# Patient Record
Sex: MALE | Race: OTHER | URBAN - METROPOLITAN AREA
[De-identification: names, ages, dates, MRNs, and addresses within clinical notes are randomized per-mention and may not be internally consistent; named-entity substitution may affect disease eponyms.]

---

## 2023-07-02 ENCOUNTER — EMERGENCY (EMERGENCY)
Facility: HOSPITAL | Age: 43
LOS: 1 days | Discharge: HOME | End: 2023-07-02
Attending: EMERGENCY MEDICINE
Payer: SELF-PAY

## 2023-07-02 VITALS
HEART RATE: 80 BPM | TEMPERATURE: 97 F | SYSTOLIC BLOOD PRESSURE: 116 MMHG | WEIGHT: 169.98 LBS | OXYGEN SATURATION: 95 % | DIASTOLIC BLOOD PRESSURE: 79 MMHG | RESPIRATION RATE: 15 BRPM | HEIGHT: 69 IN

## 2023-07-02 VITALS
OXYGEN SATURATION: 99 % | RESPIRATION RATE: 18 BRPM | DIASTOLIC BLOOD PRESSURE: 88 MMHG | SYSTOLIC BLOOD PRESSURE: 123 MMHG | TEMPERATURE: 98 F | HEART RATE: 90 BPM

## 2023-07-02 PROCEDURE — 99283 EMERGENCY DEPT VISIT LOW MDM: CPT

## 2023-07-02 PROCEDURE — 99285 EMERGENCY DEPT VISIT HI MDM: CPT

## 2023-07-02 PROCEDURE — 82962 GLUCOSE BLOOD TEST: CPT

## 2023-07-02 PROCEDURE — 99053 MED SERV 10PM-8AM 24 HR FAC: CPT

## 2023-07-02 NOTE — ED PROVIDER NOTE - PATIENT PORTAL LINK FT
You can access the FollowMyHealth Patient Portal offered by [facility] by registering at the following website: [email or web address]. By joining [facility]’s FollowMyHealth portal, you will also be able to view your health information using other applications (apps) compatible with our system.

## 2023-07-02 NOTE — ED ADULT TRIAGE NOTE - CHIEF COMPLAINT QUOTE
[place]ration to rt. knee and c/o head ache, told ems that he drink a lot

## 2023-07-02 NOTE — ED ADULT NURSE NOTE - OBJECTIVE STATEMENT
42 year old male denies [facility] BIBA for alcohol intoxication. pt denies all complaints at this time.

## 2023-07-02 NOTE — ED PROVIDER NOTE - OBJECTIVE STATEMENT
42 year old male denies [facility] BIBA for alcohol intoxication. pt denies all complaints at this time. [Takes medication as prescribed] : takes

## 2023-12-28 ENCOUNTER — INPATIENT (INPATIENT)
Facility: HOSPITAL | Age: 43
LOS: 2 days | Discharge: HOME | DRG: 369 | End: 2023-12-31
Attending: INTERNAL MEDICINE | Admitting: INTERNAL MEDICINE
Payer: MEDICAID

## 2023-12-28 VITALS
RESPIRATION RATE: 16 BRPM | TEMPERATURE: 98 F | SYSTOLIC BLOOD PRESSURE: 135 MMHG | OXYGEN SATURATION: 98 % | HEART RATE: 132 BPM | DIASTOLIC BLOOD PRESSURE: 97 MMHG

## 2023-12-28 DIAGNOSIS — K92.2 GASTROINTESTINAL HEMORRHAGE, UNSPECIFIED: ICD-10-CM

## 2023-12-28 DIAGNOSIS — D69.6 THROMBOCYTOPENIA, UNSPECIFIED: ICD-10-CM

## 2023-12-28 DIAGNOSIS — Z29.9 ENCOUNTER FOR PROPHYLACTIC MEASURES, UNSPECIFIED: ICD-10-CM

## 2023-12-28 DIAGNOSIS — F10.239 ALCOHOL DEPENDENCE WITH WITHDRAWAL, UNSPECIFIED: ICD-10-CM

## 2023-12-28 LAB
ALBUMIN SERPL ELPH-MCNC: 3.4 G/DL — SIGNIFICANT CHANGE UP (ref 3.4–5)
ALBUMIN SERPL ELPH-MCNC: 3.4 G/DL — SIGNIFICANT CHANGE UP (ref 3.4–5)
ALBUMIN SERPL ELPH-MCNC: 3.9 G/DL — SIGNIFICANT CHANGE UP (ref 3.3–5)
ALBUMIN SERPL ELPH-MCNC: 3.9 G/DL — SIGNIFICANT CHANGE UP (ref 3.3–5)
ALBUMIN SERPL ELPH-MCNC: 4.4 G/DL — SIGNIFICANT CHANGE UP (ref 3.4–5)
ALBUMIN SERPL ELPH-MCNC: 4.4 G/DL — SIGNIFICANT CHANGE UP (ref 3.4–5)
ALP SERPL-CCNC: 163 U/L — HIGH (ref 40–120)
ALP SERPL-CCNC: 163 U/L — HIGH (ref 40–120)
ALP SERPL-CCNC: 174 U/L — HIGH (ref 40–120)
ALP SERPL-CCNC: 174 U/L — HIGH (ref 40–120)
ALP SERPL-CCNC: 231 U/L — HIGH (ref 40–120)
ALP SERPL-CCNC: 231 U/L — HIGH (ref 40–120)
ALT FLD-CCNC: 43 U/L — SIGNIFICANT CHANGE UP (ref 10–45)
ALT FLD-CCNC: 43 U/L — SIGNIFICANT CHANGE UP (ref 10–45)
ALT FLD-CCNC: 54 U/L — HIGH (ref 12–42)
ALT FLD-CCNC: 54 U/L — HIGH (ref 12–42)
ALT FLD-CCNC: 69 U/L — HIGH (ref 12–42)
ALT FLD-CCNC: 69 U/L — HIGH (ref 12–42)
AMPHET UR-MCNC: NEGATIVE — SIGNIFICANT CHANGE UP
AMPHET UR-MCNC: NEGATIVE — SIGNIFICANT CHANGE UP
ANION GAP SERPL CALC-SCNC: 14 MMOL/L — SIGNIFICANT CHANGE UP (ref 5–17)
ANION GAP SERPL CALC-SCNC: 14 MMOL/L — SIGNIFICANT CHANGE UP (ref 5–17)
ANION GAP SERPL CALC-SCNC: 16 MMOL/L — SIGNIFICANT CHANGE UP (ref 9–16)
ANION GAP SERPL CALC-SCNC: 16 MMOL/L — SIGNIFICANT CHANGE UP (ref 9–16)
ANION GAP SERPL CALC-SCNC: 19 MMOL/L — HIGH (ref 9–16)
ANION GAP SERPL CALC-SCNC: 19 MMOL/L — HIGH (ref 9–16)
APPEARANCE UR: CLEAR — SIGNIFICANT CHANGE UP
APPEARANCE UR: CLEAR — SIGNIFICANT CHANGE UP
APTT BLD: 28.9 SEC — SIGNIFICANT CHANGE UP (ref 24.5–35.6)
APTT BLD: 28.9 SEC — SIGNIFICANT CHANGE UP (ref 24.5–35.6)
APTT BLD: 30.4 SEC — SIGNIFICANT CHANGE UP (ref 24.5–35.6)
APTT BLD: 30.4 SEC — SIGNIFICANT CHANGE UP (ref 24.5–35.6)
AST SERPL-CCNC: 105 U/L — HIGH (ref 15–37)
AST SERPL-CCNC: 105 U/L — HIGH (ref 15–37)
AST SERPL-CCNC: 142 U/L — HIGH (ref 15–37)
AST SERPL-CCNC: 142 U/L — HIGH (ref 15–37)
AST SERPL-CCNC: 98 U/L — HIGH (ref 10–40)
AST SERPL-CCNC: 98 U/L — HIGH (ref 10–40)
BACTERIA # UR AUTO: ABNORMAL /HPF
BACTERIA # UR AUTO: ABNORMAL /HPF
BARBITURATES UR SCN-MCNC: NEGATIVE — SIGNIFICANT CHANGE UP
BARBITURATES UR SCN-MCNC: NEGATIVE — SIGNIFICANT CHANGE UP
BASOPHILS # BLD AUTO: 0.03 K/UL — SIGNIFICANT CHANGE UP (ref 0–0.2)
BASOPHILS # BLD AUTO: 0.03 K/UL — SIGNIFICANT CHANGE UP (ref 0–0.2)
BASOPHILS # BLD AUTO: 0.05 K/UL — SIGNIFICANT CHANGE UP (ref 0–0.2)
BASOPHILS # BLD AUTO: 0.05 K/UL — SIGNIFICANT CHANGE UP (ref 0–0.2)
BASOPHILS NFR BLD AUTO: 0.6 % — SIGNIFICANT CHANGE UP (ref 0–2)
BASOPHILS NFR BLD AUTO: 0.6 % — SIGNIFICANT CHANGE UP (ref 0–2)
BASOPHILS NFR BLD AUTO: 0.7 % — SIGNIFICANT CHANGE UP (ref 0–2)
BASOPHILS NFR BLD AUTO: 0.7 % — SIGNIFICANT CHANGE UP (ref 0–2)
BENZODIAZ UR-MCNC: NEGATIVE — SIGNIFICANT CHANGE UP
BENZODIAZ UR-MCNC: NEGATIVE — SIGNIFICANT CHANGE UP
BILIRUB DIRECT SERPL-MCNC: 0.6 MG/DL — HIGH (ref 0–0.3)
BILIRUB DIRECT SERPL-MCNC: 0.6 MG/DL — HIGH (ref 0–0.3)
BILIRUB INDIRECT FLD-MCNC: 0.5 MG/DL — SIGNIFICANT CHANGE UP (ref 0.2–1)
BILIRUB INDIRECT FLD-MCNC: 0.5 MG/DL — SIGNIFICANT CHANGE UP (ref 0.2–1)
BILIRUB SERPL-MCNC: 1.1 MG/DL — SIGNIFICANT CHANGE UP (ref 0.2–1.2)
BILIRUB SERPL-MCNC: 1.1 MG/DL — SIGNIFICANT CHANGE UP (ref 0.2–1.2)
BILIRUB SERPL-MCNC: 1.3 MG/DL — HIGH (ref 0.2–1.2)
BILIRUB SERPL-MCNC: 1.3 MG/DL — HIGH (ref 0.2–1.2)
BILIRUB SERPL-MCNC: 1.7 MG/DL — HIGH (ref 0.2–1.2)
BILIRUB SERPL-MCNC: 1.7 MG/DL — HIGH (ref 0.2–1.2)
BILIRUB UR-MCNC: ABNORMAL
BILIRUB UR-MCNC: ABNORMAL
BLD GP AB SCN SERPL QL: NEGATIVE — SIGNIFICANT CHANGE UP
BLD GP AB SCN SERPL QL: NEGATIVE — SIGNIFICANT CHANGE UP
BUN SERPL-MCNC: 10 MG/DL — SIGNIFICANT CHANGE UP (ref 7–23)
BUN SERPL-MCNC: 10 MG/DL — SIGNIFICANT CHANGE UP (ref 7–23)
BUN SERPL-MCNC: 12 MG/DL — SIGNIFICANT CHANGE UP (ref 7–23)
BUN SERPL-MCNC: 12 MG/DL — SIGNIFICANT CHANGE UP (ref 7–23)
BUN SERPL-MCNC: 20 MG/DL — SIGNIFICANT CHANGE UP (ref 7–23)
BUN SERPL-MCNC: 20 MG/DL — SIGNIFICANT CHANGE UP (ref 7–23)
CALCIUM SERPL-MCNC: 7.7 MG/DL — LOW (ref 8.5–10.5)
CALCIUM SERPL-MCNC: 7.7 MG/DL — LOW (ref 8.5–10.5)
CALCIUM SERPL-MCNC: 7.9 MG/DL — LOW (ref 8.4–10.5)
CALCIUM SERPL-MCNC: 7.9 MG/DL — LOW (ref 8.4–10.5)
CALCIUM SERPL-MCNC: 9.3 MG/DL — SIGNIFICANT CHANGE UP (ref 8.5–10.5)
CALCIUM SERPL-MCNC: 9.3 MG/DL — SIGNIFICANT CHANGE UP (ref 8.5–10.5)
CHLORIDE SERPL-SCNC: 91 MMOL/L — LOW (ref 96–108)
CHLORIDE SERPL-SCNC: 91 MMOL/L — LOW (ref 96–108)
CHLORIDE SERPL-SCNC: 97 MMOL/L — SIGNIFICANT CHANGE UP (ref 96–108)
CO2 SERPL-SCNC: 23 MMOL/L — SIGNIFICANT CHANGE UP (ref 22–31)
CO2 SERPL-SCNC: 24 MMOL/L — SIGNIFICANT CHANGE UP (ref 22–31)
CO2 SERPL-SCNC: 24 MMOL/L — SIGNIFICANT CHANGE UP (ref 22–31)
COCAINE METAB.OTHER UR-MCNC: NEGATIVE — SIGNIFICANT CHANGE UP
COCAINE METAB.OTHER UR-MCNC: NEGATIVE — SIGNIFICANT CHANGE UP
COLOR SPEC: SIGNIFICANT CHANGE UP
COLOR SPEC: SIGNIFICANT CHANGE UP
CREAT SERPL-MCNC: 0.43 MG/DL — LOW (ref 0.5–1.3)
CREAT SERPL-MCNC: 0.43 MG/DL — LOW (ref 0.5–1.3)
CREAT SERPL-MCNC: 0.58 MG/DL — SIGNIFICANT CHANGE UP (ref 0.5–1.3)
CREAT SERPL-MCNC: 0.58 MG/DL — SIGNIFICANT CHANGE UP (ref 0.5–1.3)
CREAT SERPL-MCNC: 0.77 MG/DL — SIGNIFICANT CHANGE UP (ref 0.5–1.3)
CREAT SERPL-MCNC: 0.77 MG/DL — SIGNIFICANT CHANGE UP (ref 0.5–1.3)
DIFF PNL FLD: ABNORMAL
DIFF PNL FLD: ABNORMAL
EGFR: 113 ML/MIN/1.73M2 — SIGNIFICANT CHANGE UP
EGFR: 113 ML/MIN/1.73M2 — SIGNIFICANT CHANGE UP
EGFR: 123 ML/MIN/1.73M2 — SIGNIFICANT CHANGE UP
EGFR: 123 ML/MIN/1.73M2 — SIGNIFICANT CHANGE UP
EGFR: 135 ML/MIN/1.73M2 — SIGNIFICANT CHANGE UP
EGFR: 135 ML/MIN/1.73M2 — SIGNIFICANT CHANGE UP
EOSINOPHIL # BLD AUTO: 0.01 K/UL — SIGNIFICANT CHANGE UP (ref 0–0.5)
EOSINOPHIL # BLD AUTO: 0.01 K/UL — SIGNIFICANT CHANGE UP (ref 0–0.5)
EOSINOPHIL # BLD AUTO: 0.02 K/UL — SIGNIFICANT CHANGE UP (ref 0–0.5)
EOSINOPHIL # BLD AUTO: 0.02 K/UL — SIGNIFICANT CHANGE UP (ref 0–0.5)
EOSINOPHIL NFR BLD AUTO: 0.1 % — SIGNIFICANT CHANGE UP (ref 0–6)
EOSINOPHIL NFR BLD AUTO: 0.1 % — SIGNIFICANT CHANGE UP (ref 0–6)
EOSINOPHIL NFR BLD AUTO: 0.4 % — SIGNIFICANT CHANGE UP (ref 0–6)
EOSINOPHIL NFR BLD AUTO: 0.4 % — SIGNIFICANT CHANGE UP (ref 0–6)
ETHANOL SERPL-MCNC: 350 MG/DL — HIGH
ETHANOL SERPL-MCNC: 350 MG/DL — HIGH
FENTANYL UR QL SCN: NEGATIVE — SIGNIFICANT CHANGE UP
FENTANYL UR QL SCN: NEGATIVE — SIGNIFICANT CHANGE UP
GIANT PLATELETS BLD QL SMEAR: PRESENT — SIGNIFICANT CHANGE UP
GLUCOSE SERPL-MCNC: 86 MG/DL — SIGNIFICANT CHANGE UP (ref 70–99)
GLUCOSE SERPL-MCNC: 86 MG/DL — SIGNIFICANT CHANGE UP (ref 70–99)
GLUCOSE SERPL-MCNC: 91 MG/DL — SIGNIFICANT CHANGE UP (ref 70–99)
GLUCOSE SERPL-MCNC: 91 MG/DL — SIGNIFICANT CHANGE UP (ref 70–99)
GLUCOSE SERPL-MCNC: 98 MG/DL — SIGNIFICANT CHANGE UP (ref 70–99)
GLUCOSE SERPL-MCNC: 98 MG/DL — SIGNIFICANT CHANGE UP (ref 70–99)
GLUCOSE UR QL: 100 MG/DL
GLUCOSE UR QL: 100 MG/DL
GRAN CASTS # UR COMP ASSIST: PRESENT
GRAN CASTS # UR COMP ASSIST: PRESENT
HCT VFR BLD CALC: 29.8 % — LOW (ref 39–50)
HCT VFR BLD CALC: 29.8 % — LOW (ref 39–50)
HCT VFR BLD CALC: 31.2 % — LOW (ref 39–50)
HCT VFR BLD CALC: 31.2 % — LOW (ref 39–50)
HCT VFR BLD CALC: 40.6 % — SIGNIFICANT CHANGE UP (ref 39–50)
HCT VFR BLD CALC: 40.6 % — SIGNIFICANT CHANGE UP (ref 39–50)
HGB BLD-MCNC: 10.1 G/DL — LOW (ref 13–17)
HGB BLD-MCNC: 10.1 G/DL — LOW (ref 13–17)
HGB BLD-MCNC: 10.4 G/DL — LOW (ref 13–17)
HGB BLD-MCNC: 10.4 G/DL — LOW (ref 13–17)
HGB BLD-MCNC: 13.5 G/DL — SIGNIFICANT CHANGE UP (ref 13–17)
HGB BLD-MCNC: 13.5 G/DL — SIGNIFICANT CHANGE UP (ref 13–17)
HYALINE CASTS # UR AUTO: PRESENT
HYALINE CASTS # UR AUTO: PRESENT
IMM GRANULOCYTES NFR BLD AUTO: 0.1 % — SIGNIFICANT CHANGE UP (ref 0–0.9)
IMM GRANULOCYTES NFR BLD AUTO: 0.1 % — SIGNIFICANT CHANGE UP (ref 0–0.9)
IMM GRANULOCYTES NFR BLD AUTO: 0.2 % — SIGNIFICANT CHANGE UP (ref 0–0.9)
IMM GRANULOCYTES NFR BLD AUTO: 0.2 % — SIGNIFICANT CHANGE UP (ref 0–0.9)
INR BLD: 1.14 — SIGNIFICANT CHANGE UP (ref 0.85–1.18)
KETONES UR-MCNC: >=160 MG/DL
KETONES UR-MCNC: >=160 MG/DL
LACTATE BLDV-MCNC: 2.2 MMOL/L — HIGH (ref 0.5–2)
LACTATE BLDV-MCNC: 2.2 MMOL/L — HIGH (ref 0.5–2)
LACTATE BLDV-MCNC: 5.4 MMOL/L — CRITICAL HIGH (ref 0.5–2)
LACTATE BLDV-MCNC: 5.4 MMOL/L — CRITICAL HIGH (ref 0.5–2)
LEUKOCYTE ESTERASE UR-ACNC: NEGATIVE — SIGNIFICANT CHANGE UP
LEUKOCYTE ESTERASE UR-ACNC: NEGATIVE — SIGNIFICANT CHANGE UP
LIDOCAIN IGE QN: 62 U/L — SIGNIFICANT CHANGE UP (ref 16–77)
LIDOCAIN IGE QN: 62 U/L — SIGNIFICANT CHANGE UP (ref 16–77)
LYMPHOCYTES # BLD AUTO: 2.04 K/UL — SIGNIFICANT CHANGE UP (ref 1–3.3)
LYMPHOCYTES # BLD AUTO: 2.04 K/UL — SIGNIFICANT CHANGE UP (ref 1–3.3)
LYMPHOCYTES # BLD AUTO: 2.08 K/UL — SIGNIFICANT CHANGE UP (ref 1–3.3)
LYMPHOCYTES # BLD AUTO: 2.08 K/UL — SIGNIFICANT CHANGE UP (ref 1–3.3)
LYMPHOCYTES # BLD AUTO: 30.9 % — SIGNIFICANT CHANGE UP (ref 13–44)
LYMPHOCYTES # BLD AUTO: 30.9 % — SIGNIFICANT CHANGE UP (ref 13–44)
LYMPHOCYTES # BLD AUTO: 39.9 % — SIGNIFICANT CHANGE UP (ref 13–44)
LYMPHOCYTES # BLD AUTO: 39.9 % — SIGNIFICANT CHANGE UP (ref 13–44)
MAGNESIUM SERPL-MCNC: 1.2 MG/DL — LOW (ref 1.6–2.6)
MAGNESIUM SERPL-MCNC: 1.2 MG/DL — LOW (ref 1.6–2.6)
MAGNESIUM SERPL-MCNC: 1.5 MG/DL — LOW (ref 1.6–2.6)
MAGNESIUM SERPL-MCNC: 1.5 MG/DL — LOW (ref 1.6–2.6)
MANUAL SMEAR VERIFICATION: SIGNIFICANT CHANGE UP
MCHC RBC-ENTMCNC: 27.1 PG — SIGNIFICANT CHANGE UP (ref 27–34)
MCHC RBC-ENTMCNC: 27.1 PG — SIGNIFICANT CHANGE UP (ref 27–34)
MCHC RBC-ENTMCNC: 27.4 PG — SIGNIFICANT CHANGE UP (ref 27–34)
MCHC RBC-ENTMCNC: 33.3 GM/DL — SIGNIFICANT CHANGE UP (ref 32–36)
MCHC RBC-ENTMCNC: 33.9 GM/DL — SIGNIFICANT CHANGE UP (ref 32–36)
MCHC RBC-ENTMCNC: 33.9 GM/DL — SIGNIFICANT CHANGE UP (ref 32–36)
MCV RBC AUTO: 80.8 FL — SIGNIFICANT CHANGE UP (ref 80–100)
MCV RBC AUTO: 80.8 FL — SIGNIFICANT CHANGE UP (ref 80–100)
MCV RBC AUTO: 81.4 FL — SIGNIFICANT CHANGE UP (ref 80–100)
MCV RBC AUTO: 81.4 FL — SIGNIFICANT CHANGE UP (ref 80–100)
MCV RBC AUTO: 82.3 FL — SIGNIFICANT CHANGE UP (ref 80–100)
MCV RBC AUTO: 82.3 FL — SIGNIFICANT CHANGE UP (ref 80–100)
METHADONE UR-MCNC: NEGATIVE — SIGNIFICANT CHANGE UP
METHADONE UR-MCNC: NEGATIVE — SIGNIFICANT CHANGE UP
MONOCYTES # BLD AUTO: 0.21 K/UL — SIGNIFICANT CHANGE UP (ref 0–0.9)
MONOCYTES # BLD AUTO: 0.21 K/UL — SIGNIFICANT CHANGE UP (ref 0–0.9)
MONOCYTES # BLD AUTO: 0.31 K/UL — SIGNIFICANT CHANGE UP (ref 0–0.9)
MONOCYTES # BLD AUTO: 0.31 K/UL — SIGNIFICANT CHANGE UP (ref 0–0.9)
MONOCYTES NFR BLD AUTO: 4.1 % — SIGNIFICANT CHANGE UP (ref 2–14)
MONOCYTES NFR BLD AUTO: 4.1 % — SIGNIFICANT CHANGE UP (ref 2–14)
MONOCYTES NFR BLD AUTO: 4.6 % — SIGNIFICANT CHANGE UP (ref 2–14)
MONOCYTES NFR BLD AUTO: 4.6 % — SIGNIFICANT CHANGE UP (ref 2–14)
NEUTROPHILS # BLD AUTO: 2.8 K/UL — SIGNIFICANT CHANGE UP (ref 1.8–7.4)
NEUTROPHILS # BLD AUTO: 2.8 K/UL — SIGNIFICANT CHANGE UP (ref 1.8–7.4)
NEUTROPHILS # BLD AUTO: 4.27 K/UL — SIGNIFICANT CHANGE UP (ref 1.8–7.4)
NEUTROPHILS # BLD AUTO: 4.27 K/UL — SIGNIFICANT CHANGE UP (ref 1.8–7.4)
NEUTROPHILS NFR BLD AUTO: 54.8 % — SIGNIFICANT CHANGE UP (ref 43–77)
NEUTROPHILS NFR BLD AUTO: 54.8 % — SIGNIFICANT CHANGE UP (ref 43–77)
NEUTROPHILS NFR BLD AUTO: 63.6 % — SIGNIFICANT CHANGE UP (ref 43–77)
NEUTROPHILS NFR BLD AUTO: 63.6 % — SIGNIFICANT CHANGE UP (ref 43–77)
NITRITE UR-MCNC: NEGATIVE — SIGNIFICANT CHANGE UP
NITRITE UR-MCNC: NEGATIVE — SIGNIFICANT CHANGE UP
NRBC # BLD: 0 /100 WBCS — SIGNIFICANT CHANGE UP (ref 0–0)
OPIATES UR-MCNC: NEGATIVE — SIGNIFICANT CHANGE UP
OPIATES UR-MCNC: NEGATIVE — SIGNIFICANT CHANGE UP
PCO2 BLDV: 36 MMHG — LOW (ref 42–55)
PCO2 BLDV: 36 MMHG — LOW (ref 42–55)
PCP SPEC-MCNC: SIGNIFICANT CHANGE UP
PCP SPEC-MCNC: SIGNIFICANT CHANGE UP
PCP UR-MCNC: NEGATIVE — SIGNIFICANT CHANGE UP
PCP UR-MCNC: NEGATIVE — SIGNIFICANT CHANGE UP
PH BLDV: 7.45 — HIGH (ref 7.32–7.43)
PH BLDV: 7.45 — HIGH (ref 7.32–7.43)
PH UR: 6.5 — SIGNIFICANT CHANGE UP (ref 5–8)
PH UR: 6.5 — SIGNIFICANT CHANGE UP (ref 5–8)
PHOSPHATE SERPL-MCNC: 2.2 MG/DL — LOW (ref 2.5–4.5)
PHOSPHATE SERPL-MCNC: 2.2 MG/DL — LOW (ref 2.5–4.5)
PHOSPHATE SERPL-MCNC: 3.2 MG/DL — SIGNIFICANT CHANGE UP (ref 2.5–4.5)
PHOSPHATE SERPL-MCNC: 3.2 MG/DL — SIGNIFICANT CHANGE UP (ref 2.5–4.5)
PLAT MORPH BLD: ABNORMAL
PLATELET # BLD AUTO: 30 K/UL — LOW (ref 150–400)
PLATELET # BLD AUTO: 30 K/UL — LOW (ref 150–400)
PLATELET # BLD AUTO: 38 K/UL — LOW (ref 150–400)
PLATELET # BLD AUTO: 38 K/UL — LOW (ref 150–400)
PLATELET # BLD AUTO: 61 K/UL — LOW (ref 150–400)
PLATELET # BLD AUTO: 61 K/UL — LOW (ref 150–400)
PLATELET COUNT - ESTIMATE: ABNORMAL
PO2 BLDV: 45 MMHG — SIGNIFICANT CHANGE UP (ref 25–45)
PO2 BLDV: 45 MMHG — SIGNIFICANT CHANGE UP (ref 25–45)
POTASSIUM SERPL-MCNC: 3.2 MMOL/L — LOW (ref 3.5–5.3)
POTASSIUM SERPL-MCNC: 3.2 MMOL/L — LOW (ref 3.5–5.3)
POTASSIUM SERPL-MCNC: 3.5 MMOL/L — SIGNIFICANT CHANGE UP (ref 3.5–5.3)
POTASSIUM SERPL-SCNC: 3.2 MMOL/L — LOW (ref 3.5–5.3)
POTASSIUM SERPL-SCNC: 3.2 MMOL/L — LOW (ref 3.5–5.3)
POTASSIUM SERPL-SCNC: 3.5 MMOL/L — SIGNIFICANT CHANGE UP (ref 3.5–5.3)
PROT SERPL-MCNC: 10.6 G/DL — HIGH (ref 6.4–8.2)
PROT SERPL-MCNC: 10.6 G/DL — HIGH (ref 6.4–8.2)
PROT SERPL-MCNC: 8 G/DL — SIGNIFICANT CHANGE UP (ref 6–8.3)
PROT SERPL-MCNC: 8 G/DL — SIGNIFICANT CHANGE UP (ref 6–8.3)
PROT SERPL-MCNC: 8.7 G/DL — HIGH (ref 6.4–8.2)
PROT SERPL-MCNC: 8.7 G/DL — HIGH (ref 6.4–8.2)
PROT UR-MCNC: 300 MG/DL
PROT UR-MCNC: 300 MG/DL
PROTHROM AB SERPL-ACNC: 12.5 SEC — SIGNIFICANT CHANGE UP (ref 9.5–13)
PROTHROM AB SERPL-ACNC: 12.5 SEC — SIGNIFICANT CHANGE UP (ref 9.5–13)
PROTHROM AB SERPL-ACNC: 12.9 SEC — SIGNIFICANT CHANGE UP (ref 9.5–13)
PROTHROM AB SERPL-ACNC: 12.9 SEC — SIGNIFICANT CHANGE UP (ref 9.5–13)
RBC # BLD: 3.69 M/UL — LOW (ref 4.2–5.8)
RBC # BLD: 3.69 M/UL — LOW (ref 4.2–5.8)
RBC # BLD: 3.79 M/UL — LOW (ref 4.2–5.8)
RBC # BLD: 3.79 M/UL — LOW (ref 4.2–5.8)
RBC # BLD: 4.99 M/UL — SIGNIFICANT CHANGE UP (ref 4.2–5.8)
RBC # BLD: 4.99 M/UL — SIGNIFICANT CHANGE UP (ref 4.2–5.8)
RBC # FLD: 16.5 % — HIGH (ref 10.3–14.5)
RBC # FLD: 16.5 % — HIGH (ref 10.3–14.5)
RBC # FLD: 16.6 % — HIGH (ref 10.3–14.5)
RBC BLD AUTO: NORMAL — SIGNIFICANT CHANGE UP
RBC CASTS # UR COMP ASSIST: >100 /HPF — HIGH (ref 0–4)
RBC CASTS # UR COMP ASSIST: >100 /HPF — HIGH (ref 0–4)
RH IG SCN BLD-IMP: POSITIVE — SIGNIFICANT CHANGE UP
RH IG SCN BLD-IMP: POSITIVE — SIGNIFICANT CHANGE UP
SAO2 % BLDV: 67.8 % — SIGNIFICANT CHANGE UP (ref 67–88)
SAO2 % BLDV: 67.8 % — SIGNIFICANT CHANGE UP (ref 67–88)
SODIUM SERPL-SCNC: 134 MMOL/L — LOW (ref 135–145)
SODIUM SERPL-SCNC: 134 MMOL/L — LOW (ref 135–145)
SODIUM SERPL-SCNC: 134 MMOL/L — SIGNIFICANT CHANGE UP (ref 132–145)
SODIUM SERPL-SCNC: 134 MMOL/L — SIGNIFICANT CHANGE UP (ref 132–145)
SODIUM SERPL-SCNC: 136 MMOL/L — SIGNIFICANT CHANGE UP (ref 132–145)
SODIUM SERPL-SCNC: 136 MMOL/L — SIGNIFICANT CHANGE UP (ref 132–145)
SP GR SPEC: 1.02 — SIGNIFICANT CHANGE UP (ref 1–1.03)
SP GR SPEC: 1.02 — SIGNIFICANT CHANGE UP (ref 1–1.03)
THC UR QL: NEGATIVE — SIGNIFICANT CHANGE UP
THC UR QL: NEGATIVE — SIGNIFICANT CHANGE UP
URATE CRY FLD QL MICRO: PRESENT
URATE CRY FLD QL MICRO: PRESENT
UROBILINOGEN FLD QL: >=8 MG/DL (ref 0.2–1)
UROBILINOGEN FLD QL: >=8 MG/DL (ref 0.2–1)
WBC # BLD: 4.59 K/UL — SIGNIFICANT CHANGE UP (ref 3.8–10.5)
WBC # BLD: 4.59 K/UL — SIGNIFICANT CHANGE UP (ref 3.8–10.5)
WBC # BLD: 5.11 K/UL — SIGNIFICANT CHANGE UP (ref 3.8–10.5)
WBC # BLD: 5.11 K/UL — SIGNIFICANT CHANGE UP (ref 3.8–10.5)
WBC # BLD: 6.73 K/UL — SIGNIFICANT CHANGE UP (ref 3.8–10.5)
WBC # BLD: 6.73 K/UL — SIGNIFICANT CHANGE UP (ref 3.8–10.5)
WBC # FLD AUTO: 4.59 K/UL — SIGNIFICANT CHANGE UP (ref 3.8–10.5)
WBC # FLD AUTO: 4.59 K/UL — SIGNIFICANT CHANGE UP (ref 3.8–10.5)
WBC # FLD AUTO: 5.11 K/UL — SIGNIFICANT CHANGE UP (ref 3.8–10.5)
WBC # FLD AUTO: 5.11 K/UL — SIGNIFICANT CHANGE UP (ref 3.8–10.5)
WBC # FLD AUTO: 6.73 K/UL — SIGNIFICANT CHANGE UP (ref 3.8–10.5)
WBC # FLD AUTO: 6.73 K/UL — SIGNIFICANT CHANGE UP (ref 3.8–10.5)
WBC UR QL: 0 /HPF — SIGNIFICANT CHANGE UP (ref 0–5)
WBC UR QL: 0 /HPF — SIGNIFICANT CHANGE UP (ref 0–5)

## 2023-12-28 PROCEDURE — 99233 SBSQ HOSP IP/OBS HIGH 50: CPT | Mod: GC

## 2023-12-28 PROCEDURE — 99285 EMERGENCY DEPT VISIT HI MDM: CPT

## 2023-12-28 PROCEDURE — 71045 X-RAY EXAM CHEST 1 VIEW: CPT | Mod: 26

## 2023-12-28 PROCEDURE — 74177 CT ABD & PELVIS W/CONTRAST: CPT | Mod: 26

## 2023-12-28 PROCEDURE — 99223 1ST HOSP IP/OBS HIGH 75: CPT | Mod: GC

## 2023-12-28 RX ORDER — THIAMINE MONONITRATE (VIT B1) 100 MG
100 TABLET ORAL DAILY
Refills: 0 | Status: DISCONTINUED | OUTPATIENT
Start: 2023-12-28 | End: 2023-12-28

## 2023-12-28 RX ORDER — CEFTRIAXONE 500 MG/1
1000 INJECTION, POWDER, FOR SOLUTION INTRAMUSCULAR; INTRAVENOUS EVERY 24 HOURS
Refills: 0 | Status: DISCONTINUED | OUTPATIENT
Start: 2023-12-28 | End: 2023-12-31

## 2023-12-28 RX ORDER — FOLIC ACID 0.8 MG
1 TABLET ORAL DAILY
Refills: 0 | Status: DISCONTINUED | OUTPATIENT
Start: 2023-12-28 | End: 2023-12-28

## 2023-12-28 RX ORDER — SODIUM CHLORIDE 9 MG/ML
1000 INJECTION INTRAMUSCULAR; INTRAVENOUS; SUBCUTANEOUS ONCE
Refills: 0 | Status: COMPLETED | OUTPATIENT
Start: 2023-12-28 | End: 2023-12-28

## 2023-12-28 RX ORDER — DIAZEPAM 5 MG
10 TABLET ORAL EVERY 4 HOURS
Refills: 0 | Status: DISCONTINUED | OUTPATIENT
Start: 2023-12-28 | End: 2023-12-28

## 2023-12-28 RX ORDER — DIAZEPAM 5 MG
10 TABLET ORAL EVERY 4 HOURS
Refills: 0 | Status: DISCONTINUED | OUTPATIENT
Start: 2023-12-28 | End: 2023-12-29

## 2023-12-28 RX ORDER — SODIUM CHLORIDE 9 MG/ML
1000 INJECTION, SOLUTION INTRAVENOUS
Refills: 0 | Status: DISCONTINUED | OUTPATIENT
Start: 2023-12-28 | End: 2023-12-29

## 2023-12-28 RX ORDER — ONDANSETRON 8 MG/1
4 TABLET, FILM COATED ORAL ONCE
Refills: 0 | Status: COMPLETED | OUTPATIENT
Start: 2023-12-28 | End: 2023-12-28

## 2023-12-28 RX ORDER — THIAMINE MONONITRATE (VIT B1) 100 MG
500 TABLET ORAL EVERY 24 HOURS
Refills: 0 | Status: DISCONTINUED | OUTPATIENT
Start: 2023-12-29 | End: 2023-12-31

## 2023-12-28 RX ORDER — PANTOPRAZOLE SODIUM 20 MG/1
40 TABLET, DELAYED RELEASE ORAL ONCE
Refills: 0 | Status: COMPLETED | OUTPATIENT
Start: 2023-12-28 | End: 2023-12-28

## 2023-12-28 RX ORDER — SODIUM CHLORIDE 9 MG/ML
1000 INJECTION, SOLUTION INTRAVENOUS
Refills: 0 | Status: DISCONTINUED | OUTPATIENT
Start: 2023-12-28 | End: 2023-12-28

## 2023-12-28 RX ORDER — OCTREOTIDE ACETATE 200 UG/ML
50 INJECTION, SOLUTION INTRAVENOUS; SUBCUTANEOUS
Qty: 500 | Refills: 0 | Status: DISCONTINUED | OUTPATIENT
Start: 2023-12-28 | End: 2023-12-29

## 2023-12-28 RX ORDER — OCTREOTIDE ACETATE 200 UG/ML
50 INJECTION, SOLUTION INTRAVENOUS; SUBCUTANEOUS ONCE
Refills: 0 | Status: COMPLETED | OUTPATIENT
Start: 2023-12-28 | End: 2023-12-28

## 2023-12-28 RX ORDER — LANOLIN ALCOHOL/MO/W.PET/CERES
3 CREAM (GRAM) TOPICAL AT BEDTIME
Refills: 0 | Status: DISCONTINUED | OUTPATIENT
Start: 2023-12-28 | End: 2023-12-31

## 2023-12-28 RX ORDER — MAGNESIUM SULFATE 500 MG/ML
2 VIAL (ML) INJECTION ONCE
Refills: 0 | Status: COMPLETED | OUTPATIENT
Start: 2023-12-28 | End: 2023-12-28

## 2023-12-28 RX ORDER — THIAMINE MONONITRATE (VIT B1) 100 MG
100 TABLET ORAL ONCE
Refills: 0 | Status: COMPLETED | OUTPATIENT
Start: 2023-12-28 | End: 2023-12-28

## 2023-12-28 RX ORDER — INFLUENZA VIRUS VACCINE 15; 15; 15; 15 UG/.5ML; UG/.5ML; UG/.5ML; UG/.5ML
0.5 SUSPENSION INTRAMUSCULAR ONCE
Refills: 0 | Status: DISCONTINUED | OUTPATIENT
Start: 2023-12-28 | End: 2023-12-31

## 2023-12-28 RX ORDER — PANTOPRAZOLE SODIUM 20 MG/1
40 TABLET, DELAYED RELEASE ORAL EVERY 12 HOURS
Refills: 0 | Status: DISCONTINUED | OUTPATIENT
Start: 2023-12-28 | End: 2023-12-31

## 2023-12-28 RX ORDER — FOLIC ACID 0.8 MG
1 TABLET ORAL ONCE
Refills: 0 | Status: COMPLETED | OUTPATIENT
Start: 2023-12-28 | End: 2023-12-28

## 2023-12-28 RX ORDER — THIAMINE MONONITRATE (VIT B1) 100 MG
100 TABLET ORAL ONCE
Refills: 0 | Status: DISCONTINUED | OUTPATIENT
Start: 2023-12-28 | End: 2023-12-28

## 2023-12-28 RX ADMIN — PANTOPRAZOLE SODIUM 40 MILLIGRAM(S): 20 TABLET, DELAYED RELEASE ORAL at 17:58

## 2023-12-28 RX ADMIN — CEFTRIAXONE 100 MILLIGRAM(S): 500 INJECTION, POWDER, FOR SOLUTION INTRAMUSCULAR; INTRAVENOUS at 19:03

## 2023-12-28 RX ADMIN — Medication 1 TABLET(S): at 07:56

## 2023-12-28 RX ADMIN — OCTREOTIDE ACETATE 10 MICROGRAM(S)/HR: 200 INJECTION, SOLUTION INTRAVENOUS; SUBCUTANEOUS at 19:59

## 2023-12-28 RX ADMIN — PANTOPRAZOLE SODIUM 40 MILLIGRAM(S): 20 TABLET, DELAYED RELEASE ORAL at 06:40

## 2023-12-28 RX ADMIN — Medication 100 MILLIGRAM(S): at 07:50

## 2023-12-28 RX ADMIN — Medication 10 MILLIGRAM(S): at 11:08

## 2023-12-28 RX ADMIN — Medication 25 GRAM(S): at 22:35

## 2023-12-28 RX ADMIN — OCTREOTIDE ACETATE 50 MICROGRAM(S): 200 INJECTION, SOLUTION INTRAVENOUS; SUBCUTANEOUS at 19:12

## 2023-12-28 RX ADMIN — Medication 1 MILLIGRAM(S): at 07:51

## 2023-12-28 RX ADMIN — PANTOPRAZOLE SODIUM 40 MILLIGRAM(S): 20 TABLET, DELAYED RELEASE ORAL at 04:46

## 2023-12-28 RX ADMIN — Medication 50 MILLIGRAM(S): at 06:39

## 2023-12-28 RX ADMIN — SODIUM CHLORIDE 150 MILLILITER(S): 9 INJECTION, SOLUTION INTRAVENOUS at 09:40

## 2023-12-28 RX ADMIN — ONDANSETRON 4 MILLIGRAM(S): 8 TABLET, FILM COATED ORAL at 04:45

## 2023-12-28 RX ADMIN — SODIUM CHLORIDE 2000 MILLILITER(S): 9 INJECTION INTRAMUSCULAR; INTRAVENOUS; SUBCUTANEOUS at 18:45

## 2023-12-28 RX ADMIN — Medication 10 MILLIGRAM(S): at 16:23

## 2023-12-28 RX ADMIN — SODIUM CHLORIDE 1000 MILLILITER(S): 9 INJECTION INTRAMUSCULAR; INTRAVENOUS; SUBCUTANEOUS at 04:46

## 2023-12-28 RX ADMIN — Medication 2 MILLIGRAM(S): at 17:22

## 2023-12-28 RX ADMIN — SODIUM CHLORIDE 1000 MILLILITER(S): 9 INJECTION INTRAMUSCULAR; INTRAVENOUS; SUBCUTANEOUS at 06:40

## 2023-12-28 NOTE — ED PROVIDER NOTE - WR ORDER ID 1
[id number] Custom Shielding Afterword Text Will Not Be Included With Simple Simulations (X X Y Cm............): port to correlate with the lesion size, including treatment margin. The custom lead shield is adequate to accommodate the appropriate applicator and provide adequate shielding around the treatment site. Additional shielding (as noted below) is used to protect sensitive, normal tissues. [id number]

## 2023-12-28 NOTE — ED PROVIDER NOTE - CLINICAL SUMMARY MEDICAL DECISION MAKING FREE TEXT BOX
Nickolas, PGY3 - 43-year-old man with alcohol abuse, presenting due to hematemesis x 4 yesterday.  Epigastric tenderness on exam, most likely due to gastritis versus esophageal varices.  Left lower quadrant pain as well, will get a CT scan for further evaluation.  Labs, fluids, reassess.  Will give Librium due to history of significant withdrawal symptoms.  Patient to be admitted for further evaluation *The above represents an initial assessment/impression. Please refer to progress notes for potential changes in patient clinical course*

## 2023-12-28 NOTE — PATIENT PROFILE ADULT - CONTRAINDICATIONS & PRECAUTIONS (SELECT ALL THAT APPLY)

## 2023-12-28 NOTE — PROGRESS NOTE ADULT - ASSESSMENT
44 YO M who denies PMH other than ETOH abuse disorder with a hx of withdrawal sz x1 presented to [facility] due to 4 episodes of hematemesis at home, stepped up to MICU for management for ETOH withdrawal and c/f UGIB    NEURO:   #ETOH withdrawal - patient with history of sz disorder, states drinks about 1 bottle of vodka per week with last drink being 2 days ago. ETOH level noted to be 300 at [facility]. ICU consulted for CIWA >15. On initial CIWA on evaluation patient was >15 with significant improvement w/ Valium and ativan.   Plan:  - c/w high risk CIWA protocol   - c/w valium 10mg q4 hours CIWA >8, ativan 2mg q1 hr CIWA >8   - c/w thiamine, MV, folate supplementation       PULM:   - Breathing well on RA, no tachypnea  - NPO, aspiration precautions     CARDIO  #Hemodynamics  #Sinus tachycardia - likely multifactorial i/s/o ETOH withdrawal and c/f UGIB. BP currently stable. Does not need pressors support at this time.   - continue to monitor BP and tachycardia     GI:   #Decompensated Cirrhosis 2/2 ETOH abuse - patient with cirrhosis seen on CT w/ varices. INR wnl. C/w esophageal varices vs. Loreto alt[place]irano tear. Hgb drop from 13.5-->10, repeat CBC 10. Plt 30.   Plan:  - GI consulted - will do EGD in AM, keep patient NPO   - CTX 1g x 7 days for PPX  - Octreotide gtt  - PPI 40mg BID IVP  - Aspiration percautions  - Hepatitis panel     :  - Urine noted in bladder during POCUS  - monitor UOP assure adequate >0.5cc/kg per her     RENAL:  - patient with normal BUN/Cr. Initial w/ elevated AG i/s/o elevated lactate which has downtrended after IVF   - c/w D5 + NS as patient will be NPO and will need dextrose i/s/o heavy alcohol use and poor PO intake to avoid significant ketosis     MSK: No active issues     ENDOCRINE:  - monitor finger sticks     ID:  - Ceftriaxone for prophylaxis     FEN:   F: 1L NS bolus, c/w D5+ LR maintenance fluids   E: Replete lytes PRN K<4, Mg <2  N: NPO  Lines/Ahumada: 20G RUE, 18G LUE x1  PPx: SCDs  Code: FULL CODE    Dispo: Patient requires continued monitoring in MICU, case discussed and evaluated by Dr. Raygoza   42 YO M who denies PMH other than ETOH abuse disorder with a hx of withdrawal sz x1 presented to [facility] due to 4 episodes of hematemesis at home, stepped up to MICU for management for ETOH withdrawal and c/f UGIB    NEURO:   #ETOH withdrawal - patient with history of sz disorder, states drinks about 1 bottle of vodka per week with last drink being 2 days ago. ETOH level noted to be 300 at [facility]. ICU consulted for CIWA >15. On initial CIWA on evaluation patient was >15 with significant improvement w/ Valium and ativan.   Plan:  - c/w high risk CIWA protocol   - c/w valium 10mg q4 hours CIWA >8, ativan 2mg q1 hr CIWA >8   - c/w thiamine, MV, folate supplementation       PULM:   - Breathing well on RA, no tachypnea  - NPO, aspiration precautions     CARDIO  #Hemodynamics  #Sinus tachycardia - likely multifactorial i/s/o ETOH withdrawal and c/f UGIB. BP currently stable. Does not need pressors support at this time.   - continue to monitor BP and tachycardia     GI:   #Decompensated Cirrhosis 2/2 ETOH abuse - patient with cirrhosis seen on CT w/ varices. INR wnl. C/w esophageal varices vs. Loreto altamirano tear. Hgb drop from 13.5-->10, repeat CBC 10. Plt 30.   Plan:  - GI consulted - will do EGD in AM, keep patient NPO   - CTX 1g x 7 days for PPX  - Octreotide gtt  - PPI 40mg BID IVP  - Aspiration percautions  - Hepatitis panel     :  - Urine noted in bladder during POCUS  - monitor UOP assure adequate >0.5cc/kg per her     RENAL:  - patient with normal BUN/Cr. Initial w/ elevated AG i/s/o elevated lactate which has downtrended after IVF   - c/w D5 + NS as patient will be NPO and will need dextrose i/s/o heavy alcohol use and poor PO intake to avoid significant ketosis     MSK: No active issues     ENDOCRINE:  - monitor finger sticks     ID:  - Ceftriaxone for prophylaxis     FEN:   F: 1L NS bolus, c/w D5+ LR maintenance fluids   E: Replete lytes PRN K<4, Mg <2  N: NPO  Lines/Ahumada: 20G RUE, 18G LUE x1  PPx: SCDs  Code: FULL CODE    Dispo: Patient requires continued monitoring in MICU, case discussed and evaluated by Dr. Raygoza   44 YO M who denies PMH other than ETOH abuse disorder with a hx of withdrawal sz x1 presented to [facility] due to 4 episodes of hematemesis at home, stepped up to MICU for management for ETOH withdrawal and c/f UGIB    NEURO:   #ETOH withdrawal - patient with history of seizure disorder, states drinks about 1 bottle of vodka per week with last drink being 12/26. DUSTIN 350. Had CIWA >15; with significant improvement w/ Valium and ativan.   Plan:  - c/w high risk CIWA protocol   - c/w valium 10mg q4 hours CIWA >8, ativan 2mg q1 hr CIWA >8   - c/w thiamine, MV, folate supplementation     PULM:   - Breathing well on RA, no tachypnea  - NPO, aspiration precautions     CARDIO  #Hemodynamics  #Sinus tachycardia - likely multifactorial i/s/o ETOH withdrawal and c/f UGIB. BP currently stable. Does not need pressors support at this time.   - continue to monitor BP and tachycardia     GI:   #Decompensated Cirrhosis 2/2 ETOH abuse - patient with cirrhosis seen on CT w/ varices. INR wnl. C/w esophageal varices vs. Lore[place] Edmond[place] tear. Hb drop from 13.5-->10, repeat CBC 10. Plt dropped from 61 to 30.   Plan:  - GI consulted - will do EGD on 12/29 AM, keep patient NPO   - CTX 1g x 7 days for PPX  - Octreotide gtt  - PPI 40mg BID IVP  - Aspiration precautions  - F/u hepatitis panel     :  - Urine noted in bladder during POCUS  - monitor UOP assure adequate >0.5cc/kg per her     RENAL:  - patient with normal BUN/Cr. Initial w/ elevated AG i/s/o elevated lactate which has downtrended after IVF   - c/w D5 + NS as patient will be NPO and will need dextrose i/s/o heavy alcohol use and poor PO intake to avoid significant ketosis     MSK:   No active issues     ENDOCRINE:  - monitor finger sticks     ID:  - Ceftriaxone for prophylaxis     FEN:   F: 1L NS bolus, c/w D5+ LR maintenance fluids   E: Replete lytes PRN K<4, Mg <2  N: NPO  Lines/Ahumada: 20G RUE, 18G LUE x1  PPx: SCDs  Code: FULL CODE  Dispo: MICU  42 YO M who denies PMH other than ETOH abuse disorder with a hx of withdrawal sz x1 presented to [facility] due to 4 episodes of hematemesis at home, stepped up to MICU for management for ETOH withdrawal and c/f UGIB    NEURO:   #ETOH withdrawal - patient with history of seizure disorder, states drinks about 1 bottle of vodka per week with last drink being 12/26. DUSTIN 350. Had CIWA >15; with significant improvement w/ Valium and ativan.   Plan:  - c/w high risk CIWA protocol   - c/w valium 10mg q4 hours CIWA >8, ativan 2mg q1 hr CIWA >8   - c/w thiamine, MV, folate supplementation     PULM:   - Breathing well on RA, no tachypnea  - NPO, aspiration precautions     CARDIO  #Hemodynamics  #Sinus tachycardia - likely multifactorial i/s/o ETOH withdrawal and c/f UGIB. BP currently stable. Does not need pressors support at this time.   - continue to monitor BP and tachycardia     GI:   #Decompensated Cirrhosis 2/2 ETOH abuse - patient with cirrhosis seen on CT w/ varices. INR wnl. C/w esophageal varices vs. Lore[place]s tear. Hb drop from 13.5-->10, repeat CBC 10. Plt dropped from 61 to 30.   Plan:  - GI consulted - will do EGD on 12/29 AM, keep patient NPO   - CTX 1g x 7 days for PPX  - Octreotide gtt  - PPI 40mg BID IVP  - Aspiration precautions  - F/u hepatitis panel     :  - Urine noted in bladder during POCUS  - monitor UOP assure adequate >0.5cc/kg per her     RENAL:  - patient with normal BUN/Cr. Initial w/ elevated AG i/s/o elevated lactate which has downtrended after IVF   - c/w D5 + NS as patient will be NPO and will need dextrose i/s/o heavy alcohol use and poor PO intake to avoid significant ketosis     MSK:   No active issues     ENDOCRINE:  - monitor finger sticks     ID:  - Ceftriaxone for prophylaxis     FEN:   F: 1L NS bolus, c/w D5+ LR maintenance fluids   E: Replete lytes PRN K<4, Mg <2  N: NPO  Lines/Ahumada: 20G RUE, 18G LUE x1  PPx: SCDs  Code: FULL CODE  Dispo: MICU

## 2023-12-28 NOTE — H&P ADULT - NSHPPHYSICALEXAM_GEN_ALL_CORE
GENERAL: NAD, visible discomfort  HEAD:  Atraumatic, normocephalic  EYES: conjunctiva and sclera clear  NECK: Supple, trachea midline, no JVD  HEART: Regular rhythm, no murmurs, rubs, or gallops. Tachycardia  LUNGS: agonal respirations.  Clear to auscultation bilaterally, no crackles, wheezing, or rhonchi  ABDOMEN: Soft, nondistended, +BS, tender in LLQ  EXTREMITIES: 2+ peripheral pulses bilaterally. No clubbing, cyanosis, or edema  NERVOUS SYSTEM: arousable to name, oriented x3, moving all extremities, no focal deficits   SKIN: No rashes or lesions

## 2023-12-28 NOTE — CONSULT NOTE ADULT - ASSESSMENT
Assessment and Plan:     NEURO:    PULM:    CARDIO  #Hemodynamics    GI:  GI to do EGD in AM     :    RENAL:    MSK:    ENDOCRINE:    ID:    FEN:   F: 1L NS bolus, c/w D5+ LR maintenance fluids   E: Replete lytes PRN K<4, Mg <2  N: NPO  Lines/Ahumada: 20G RUE, 18G LUE x1  PPx: SCDs  Code: FULL CODE    Dispo: Patient requires continued monitoring in MICU, case discussed and evaluated by Dr. Raygoza  Assessment and Plan: 44 YO M who denies PMH other than ETOH abuse disorder with a hx of withdrawal sz x1 presented to [facility] due to 4 episodes of hematemesis at home, stepped up to MICU for management for ETOH withdrawal and c/f UGIB    NEURO:   #ETOH withdrawal - patient with history of sz disorder, states drinks about 1 bottle of vodka per week with last drink being 2 days ago. ETOH level noted to be 300 at [facility]. ICU consulted for CIWA >15. On initial CIWA on evaluation patient was >15 with significant improvement w/ Valium and ativan.   Plan:  - c/w high risk CIWA protocol   - c/w valium 10mg q4 hours CIWA >8, ativan 2mg q1 hr CIWA >8   - c/w thiamine, MV, folate supplementation       PULM:   - Breathing well on RA, no tachypnea  - NPO, aspiration precautions     CARDIO  #Hemodynamics  #Sinus tachycardia - likely multifactorial i/s/o ETOH withdrawal and c/f UGIB. BP currently stable. Does not need pressors support at this time.   - continue to monitor BP and tachycardia     GI:   #Decompensated Cirrhosis 2/2 ETOH abuse - patient with cirrhosis seen on CT w/ varices. INR wnl. C/w esophageal varices vs. Loreto altamirano tear. Hgb drop from 13.5-->10, repeat CBC 10. Plt 30.   Plan:  - GI consulted - will do EGD in AM, keep patient NPO   - CTX 1g x 7 days for PPX  - Octreotide gtt  - PPI 40mg BID IVP  - Aspiration percautions  - Hepatitis panel     :  - Urine noted in bladder during POCUS  - monitor UOP assure adequate >0.5cc/kg per her     RENAL:  - patient with normal BUN/Cr. Initial w/ elevated AG i/s/o elevated lactate which has downtrended after IVF   - c/w D5 + NS as patient will be NPO and will need dextrose i/s/o heavy alcohol use and poor PO intake to avoid significant ketosis     MSK: No active issues     ENDOCRINE:  - monitor finger sticks     ID:  - Ceftriaxone for prophylaxis     FEN:   F: 1L NS bolus, c/w D5+ LR maintenance fluids   E: Replete lytes PRN K<4, Mg <2  N: NPO  Lines/Ahumada: 20G RUE, 18G LUE x1  PPx: SCDs  Code: FULL CODE    Dispo: Patient requires continued monitoring in MICU, case discussed and evaluated by Dr. Raygoza  Assessment and Plan: 42 YO M who denies PMH other than ETOH abuse disorder with a hx of withdrawal sz x1 presented to [facility] due to 4 episodes of hematemesis at home, stepped up to MICU for management for ETOH withdrawal and c/f UGIB    NEURO:   #ETOH withdrawal - patient with history of sz disorder, states drinks about 1 bottle of vodka per week with last drink being 2 days ago. ETOH level noted to be 300 at [facility]. ICU consulted for CIWA >15. On initial CIWA on evaluation patient was >15 with significant improvement w/ Valium and ativan.   Plan:  - c/w high risk CIWA protocol   - c/w valium 10mg q4 hours CIWA >8, ativan 2mg q1 hr CIWA >8   - c/w thiamine, MV, folate supplementation       PULM:   - Breathing well on RA, no tachypnea  - NPO, aspiration precautions     CARDIO  #Hemodynamics  #Sinus tachycardia - likely multifactorial i/s/o ETOH withdrawal and c/f UGIB. BP currently stable. Does not need pressors support at this time.   - continue to monitor BP and tachycardia     GI:   #Decompensated Cirrhosis 2/2 ETOH abuse - patient with cirrhosis seen on CT w/ varices. INR wnl. C/w esophageal varices vs. Loreto altamirano tear. Hgb drop from 13.5-->10, repeat CBC 10. Plt 30.   Plan:  - GI consulted - will do EGD in AM, keep patient NPO   - CTX 1g x 7 days for PPX  - Octreotide gtt  - PPI 40mg BID IVP  - Aspiration percautions  - Hepatitis panel     :  - Urine noted in bladder during POCUS  - monitor UOP assure adequate >0.5cc/kg per her     RENAL:  - patient with normal BUN/Cr. Initial w/ elevated AG i/s/o elevated lactate which has downtrended after IVF   - c/w D5 + NS as patient will be NPO and will need dextrose i/s/o heavy alcohol use and poor PO intake to avoid significant ketosis     MSK: No active issues     ENDOCRINE:  - monitor finger sticks     ID:  - Ceftriaxone for prophylaxis     FEN:   F: 1L NS bolus, c/w D5+ LR maintenance fluids   E: Replete lytes PRN K<4, Mg <2  N: NPO  Lines/Ahumada: 20G RUE, 18G LUE x1  PPx: SCDs  Code: FULL CODE    Dispo: Patient requires continued monitoring in MICU, case discussed and evaluated by Dr. Raygoza

## 2023-12-28 NOTE — ED PROVIDER NOTE - ATTENDING CONTRIBUTION TO CARE
I have discussed the case with the resident/mid level provider. I have personally performed a history, physical exam, and my own medical decision making. I have reviewed the note and agree with the findings and plan       Patient Swazi-speaking only translation provided by me he has a history as noted in the HPI with alcohol withdrawal severe in the past, here he is conversive has a low CIWA score CIWA order set initiated.  Patient will require admission he is pending read of his CT studies his hemoglobin is normal. I have discussed the case with the resident/mid level provider. I have personally performed a history, physical exam, and my own medical decision making. I have reviewed the note and agree with the findings and plan       Patient Bangladeshi-speaking only translation provided by me he has a history as noted in the HPI with alcohol withdrawal severe in the past, here he is conversive has a low CIWA score CIWA order set initiated.  Patient will require admission he is pending read of his CT studies his hemoglobin is normal.

## 2023-12-28 NOTE — ED ADULT TRIAGE NOTE - CHIEF COMPLAINT QUOTE
[place] w/ blood x1 hour. Pt admits to alcohol use, denies drug use, pain or injuries.

## 2023-12-28 NOTE — ED PROVIDER NOTE - PATIENT/CAREGIVER ACCEPTED INTERPRETER SERVICES
Patient: Kymberly Verma Date: 3/20/2019   : 1959 Attending: Lis Mcgovern*   59 year old female      ASLSS    PROCEDURE ASSESSMENT   (LOCAL ANESTHESIA - Not for use with Conscious Sedation)    Preop Diagnosis / Indications for Procedure: Shoulder Pain    Planned Procedure: Shoulder Arthrogram    Planned Anesthetic:  local      MEDICAL HISTORY / COMORBID CONDITIONS:  Significant medical / surgical history: no    Normal mental status:   no      EXAMINATION PERTINENT TO PROCEDURE BEING PERFORMED  Evaluation of operative site normal      OTHER FINDINGS  Reviewed current medications and allergies yes      PERTINENT LAB / DIAGNOSTIC TESTS  NONE      INFORMED CONSENT  Consent obtained: yes    Risks / benefits, complications, and alternatives explained, questions answered and patient / personal representative agrees to:  procedure listed above  
yes

## 2023-12-28 NOTE — ED ADULT NURSE NOTE - NSFALLUNIVINTERV_ED_ALL_ED
Bed/Stretcher in lowest position, wheels locked, appropriate side rails in place/Call bell, personal items and telephone in reach/Instruct patient to call for assistance before getting out of bed/chair/stretcher/Non-slip footwear applied when patient is off stretcher/[place] to call system/Physically safe environment - no spills, clutter or unnecessary equipment/Purposeful proactive rounding/Room/bathroom lighting operational, light cord in reach Bed/Stretcher in lowest position, wheels locked, appropriate side rails in place/Call bell, personal items and telephone in reach/Instruct patient to call for assistance before getting out of bed/chair/stretcher/Non-slip footwear applied when patient is off stretcher/[place] to call system/Physically safe environment - no spills, clutter or unnecessary equipment/Purposeful proactive rounding/Room/bathroom lighting operational, light cord in reach

## 2023-12-28 NOTE — H&P ADULT - PROBLEM SELECTOR PLAN 1
Patient with history of alcohol withdrawal seizure 7 years ago and intubation.  On arrival to [facility] CIWA 16. High risk CIWA protocol initiated, ICU consulted.    - high risk CIWA protocol  - ativan 2 mg for CIWA >8 Patient with history of alcohol withdrawal seizure 7 years ago and intubation.  On arrival to [facility] CIWA 16. High risk CIWA protocol initiated, ICU consulted.    - high risk CIWA protocol  - ativan 2 mg for CIWA >8

## 2023-12-28 NOTE — PATIENT PROFILE ADULT - FUNCTIONAL ASSESSMENT - BASIC MOBILITY 6.
2-calculated by average/Not able to assess (calculate score using [facility] averaging method)  2-calculated by average/Not able to assess (calculate score using [facility] averaging method)

## 2023-12-28 NOTE — H&P ADULT - HISTORY OF PRESENT ILLNESS
HPI:     In the ED:  Initial vital signs: T: XX F, HR: XX, BP: XX, R: XX, SpO2: XX% on RA  ED course:   Labs: significant for  Imaging:  CXR:   EKG:   Medications: chlordiazePOXIDE 50 milliGRAM(s) Oral Once  folic acid Injectable 1 milliGRAM(s) IV Push Once  ondansetron Injectable 4 milliGRAM(s) IV Push once  pantoprazole  Injectable 40 milliGRAM(s) IV Push Once  pantoprazole  Injectable 40 milliGRAM(s) IV Push Once  sodium chloride 0.9% Bolus 1000 milliLiter(s) IV Bolus once  sodium chloride 0.9% Bolus 1000 milliLiter(s) IV Bolus once  thiamine Injectable 100 milliGRAM(s) IV Push Once    Consults: none      HPI: The patient is a 44 YO M who denies PMH presented to [facility] due to 4 episodes of hematemesis. at home He states that when he drinks he usually finishes a bottle of vodka most weekend days. He states that his last drink was 2 days ago. He endorses a history of alcohol withdrawal symptoms including visual and auditory hallucinations, tremors, and nausea. He states that he had one seizure 7 years ago after drinking but was not evaluated because he felt that he recovered after. Also endorses that he was intubated one time for alcohol use. States that when he drinks he doesn't eat when he drinks, last meal was 2 days ago.  Endorses: HA 9/10, epigastric pain, tremor, auditory hallucinations when he closes his eyes, chest discomfort, SOB, hematemesis nausea  Denies: constipation, diarrhea, urinary symptoms    In the ED:  Initial vital signs: T: 97.8 F, HR: 132, BP: 135/97, R: 16, SpO2: 98% on RA  ED course:   Labs: significant for blood alcohol 350, Alk Phos 231, , ALT 69, Cl 91, PLT 61, BUN 19, Lactate 5.4 -> 2.2, urine ketone moderate, hyaline casts, granular casts. hematuria, glucosuria 100  Imaging:  CXR: Clear lungs  CTAP: 1. No acute abdominal/pelvic pathology or primary etiology of pain.  2. Cirrhotic .micronodular liver with portal hypertension: diffuse small varices  3. Cholelithiasis  4. Distended urinary bladder (<500 cc). Correlate with normal voiding  EKG: sinus tachycardia  Medications: chlordiazePOXIDE 50 milliGRAM(s) Oral Once  folic acid Injectable 1 milliGRAM(s) IV Push Once  ondansetron Injectable 4 milliGRAM(s) IV Push once  pantoprazole  Injectable 40 milliGRAM(s) IV Push Once  pantoprazole  Injectable 40 milliGRAM(s) IV Push Once  sodium chloride 0.9% Bolus 1000 milliLiter(s) IV Bolus once  sodium chloride 0.9% Bolus 1000 milliLiter(s) IV Bolus once  thiamine Injectable 100 milliGRAM(s) IV Push Once    Consults: none     HPI: The patient is a 42 YO M who denies PMH presented to [facility] due to 4 episodes of hematemesis. at home He states that when he drinks he usually finishes a bottle of vodka most weekend days. He states that his last drink was 2 days ago. He endorses a history of alcohol withdrawal symptoms including visual and auditory hallucinations, tremors, and nausea. He states that he had one seizure 7 years ago after drinking but was not evaluated because he felt that he recovered after. Also endorses that he was intubated one time for alcohol use. States that when he drinks he doesn't eat when he drinks, last meal was 2 days ago.  Endorses: HA 9/10, epigastric pain, tremor, auditory hallucinations when he closes his eyes, chest discomfort, SOB, hematemesis nausea  Denies: constipation, diarrhea, urinary symptoms    In the ED:  Initial vital signs: T: 97.8 F, HR: 132, BP: 135/97, R: 16, SpO2: 98% on RA  ED course:   Labs: significant for blood alcohol 350, Alk Phos 231, , ALT 69, Cl 91, PLT 61, BUN 19, Lactate 5.4 -> 2.2, urine ketone moderate, hyaline casts, granular casts. hematuria, glucosuria 100  Imaging:  CXR: Clear lungs  CTAP: 1. No acute abdominal/pelvic pathology or primary etiology of pain.  2. Cirrhotic .micronodular liver with portal hypertension: diffuse small varices  3. Cholelithiasis  4. Distended urinary bladder (<500 cc). Correlate with normal voiding  EKG: sinus tachycardia  Medications: chlordiazePOXIDE 50 milliGRAM(s) Oral Once  folic acid Injectable 1 milliGRAM(s) IV Push Once  ondansetron Injectable 4 milliGRAM(s) IV Push once  pantoprazole  Injectable 40 milliGRAM(s) IV Push Once  pantoprazole  Injectable 40 milliGRAM(s) IV Push Once  sodium chloride 0.9% Bolus 1000 milliLiter(s) IV Bolus once  sodium chloride 0.9% Bolus 1000 milliLiter(s) IV Bolus once  thiamine Injectable 100 milliGRAM(s) IV Push Once    Consults: none

## 2023-12-28 NOTE — PATIENT PROFILE ADULT - FALL HARM RISK - HARM RISK INTERVENTIONS
Assistance with ambulation/Assistance OOB with selected safe patient handling equipment/Communicate Risk of Fall with Harm to all staff/Monitor for mental status changes/Monitor gait and stability/Reinforce activity limits and safety measures with patient and family/Tailored Fall Risk Interventions/Toileting schedule using arm’s reach rule for commode and bathroom/Use of alarms - bed, chair and/or voice tab/Visual Cue: Yellow wristband and red socks/Bed in lowest position, wheels locked, appropriate side rails in place/Call bell, personal items and telephone in reach/Instruct patient to call for assistance before getting out of bed or chair/Non-slip footwear when patient is out of bed/[place] to call system/Physically safe environment - no spills, clutter or unnecessary equipment/Purposeful Proactive Rounding/Room/bathroom lighting operational, light cord in reach Assistance with ambulation/Assistance OOB with selected safe patient handling equipment/Communicate Risk of Fall with Harm to all staff/Monitor for mental status changes/Monitor gait and stability/Reinforce activity limits and safety measures with patient and family/Tailored Fall Risk Interventions/Toileting schedule using arm’s reach rule for commode and bathroom/Use of alarms - bed, chair and/or voice tab/Visual Cue: Yellow wristband and red socks/Bed in lowest position, wheels locked, appropriate side rails in place/Call bell, personal items and telephone in reach/Instruct patient to call for assistance before getting out of bed or chair/Non-slip footwear when patient is out of bed/[place] to call system/Physically safe environment - no spills, clutter or unnecessary equipment/Purposeful Proactive Rounding/Room/bathroom lighting operational, light cord in reach

## 2023-12-28 NOTE — ED PROVIDER NOTE - NS ED MD DISPO ISOLATION TYPES
Encounter Date: 11/7/2023       History     Chief Complaint   Patient presents with    abnormal labs     Generalized weakness with SOB, denies Dark tarry stool. Reports recent abnormal labs.      Pt c/o was sent here for blood transfusion from her doctors office. Does not have any labs with her. (We will call office to clarify why pt is here.) Pt states she feels ok, little tired occasional SOB. Stools are dark green, no blood noticed. Having issues with chronic back pain and chronic stomach issues. No N/V. Denies any fever. No CP.    [facility] stated they spoke with hospitalist yesterday and he was suppose to admit for Anemia. They were unable to get a ride here yesterday. House sup was notified, she suggested to do ER work up and decided upon admit afterwards.         The history is provided by the patient and a relative. No  was used.     Review of patient's allergies indicates:   Allergen Reactions    Sulfa (sulfonamide antibiotics) Swelling     mouth    Acetaminophen Nausea And Vomiting     Facial swelling      Past Medical History:   Diagnosis Date    Anxiety     Arthritis     Ascending aorta dilatation     Asthma     Atrial fibrillation     Breast cancer     CAD (coronary artery disease)     Clotting disorder     JONES (dyspnea on exertion)     DVT (deep venous thrombosis)     Essential (primary) hypertension     GERD (gastroesophageal reflux disease)     Hypoglycemia     Hypothyroidism, unspecified     Insomnia     Migraines     Mixed hyperlipidemia     Myasthenia gravis without (acute) exacerbation     Pacemaker     PAF (paroxysmal atrial fibrillation)     TIA (transient ischemic attack)      Past Surgical History:   Procedure Laterality Date    APPENDECTOMY      BREAST LUMPECTOMY      CATARACT EXTRACTION      CHOLECYSTECTOMY      CLOSURE OF LEFT ATRIAL APPENDAGE USING DEVICE N/A 4/5/2023    Procedure: Left atrial appendage closure device;  Surgeon: Robert Brito  MD [place];  Location: [facility] CATH LAB;  Service: Cardiology;  Laterality: N/A;  WATCHMAN W/ INTRA OP MALU    COLONOSCOPY      CORONARY STENT PLACEMENT  2018    ECHOCARDIOGRAM,TRANSESOPHAGEAL N/A 4/5/2023    Procedure: Transesophageal echo (MALU) intra-procedure log documentation;  Surgeon: Selina Diagnostic Provider;  Location: [facility] CATH LAB;  Service: Cardiology;  Laterality: N/A;    INSERTION OF PACEMAKER      PARTIAL HYSTERECTOMY       Family History   Problem Relation Age of Onset    Heart disease Father      Social History     Tobacco Use    Smoking status: Never    Smokeless tobacco: Never   Substance Use Topics    Alcohol use: Never    Drug use: Never     Review of Systems   Constitutional:  Positive for fatigue. Negative for appetite change, chills, diaphoresis and fever.   Respiratory: Negative.  Negative for cough, chest tightness and shortness of breath.    Cardiovascular:  Negative for chest pain and palpitations.   Gastrointestinal:  Positive for abdominal pain. Negative for abdominal distention, blood in stool, nausea and vomiting.   Genitourinary:  Negative for dysuria and hematuria.   Skin:  Negative for color change, pallor and rash.   Neurological:  Positive for weakness. Negative for dizziness, syncope, numbness and headaches.   Psychiatric/Behavioral:  Negative for agitation.        Physical Exam     Initial Vitals [11/07/23 1227]   BP Pulse Resp Temp SpO2   (!) 152/84 69 19 98.6 °F (37 °C) 99 %      MAP       --         Physical Exam    Nursing note and vitals reviewed.  Constitutional: She appears well-developed and well-nourished.   HENT:   Head: Normocephalic.   Eyes: Conjunctivae are normal. Pupils are equal, round, and reactive to light.   Neck: Neck supple.   Normal range of motion.  Cardiovascular:  Normal rate and regular rhythm.           Pulmonary/Chest: Breath sounds normal. No respiratory distress. She has no wheezes. She has no rhonchi. She has no rales. She exhibits no tenderness.    Abdominal: Abdomen is soft. Bowel sounds are normal. There is no abdominal tenderness. There is no rebound and no guarding.   Musculoskeletal:         General: No tenderness or edema. Normal range of motion.      Cervical back: Normal range of motion and neck supple.     Neurological: She is alert and oriented to person, place, and time.   Skin: Skin is warm and dry. Capillary refill takes less than 2 seconds.   Psychiatric: She has a normal mood and affect.         ED Course   Procedures  Labs Reviewed   COMPREHENSIVE METABOLIC PANEL - Abnormal; Notable for the following components:       Result Value    Blood Urea Nitrogen 29.0 (*)     Aspartate Aminotransferase 41 (*)     BUN/Creatinine Ratio 27 (*)     All other components within normal limits   CBC WITH DIFFERENTIAL - Abnormal; Notable for the following components:    RBC 2.93 (*)     Hgb 8.9 (*)     Hct 27.1 (*)     RDW 15.5 (*)     Neut % 73.3 (*)     Lymph % 13.7 (*)     Eos % 0.6 (*)     Lymph # 0.65 (*)     Mono # 0.55 (*)     Eos # 0.03 (*)     IG% 0.6 (*)     All other components within normal limits   URINALYSIS - Normal    Narrative:      URINE STABILITY IS 2 HOURS AT ROOM TEMP OR    SIX HOURS REFRIGERATED. PERFORMING TESTING ON    SPECIMENS GREATER THAN THIS AGE MAY AFFECT THE    FOLLOWING TESTS:    PH          SPECIFIC GRAVITY           BLOOD    CLARITY     BILIRUBIN               UROBILINOGEN   CBC W/ AUTO DIFFERENTIAL    Narrative:     The following orders were created for panel order CBC auto differential.  Procedure                               Abnormality         Status                     ---------                               -----------         ------                     CBC with Differential[[id number]]        Abnormal            Final result                 Please view results for these tests on the individual orders.   OCCULT BLOOD, STOOL 1ST SPECIMEN   TROPONIN I          Imaging Results              CT Head Without Contrast (In  process)                      X-Ray Chest AP Portable (Final result)  Result time 11/07/23 14:00:26      Final result by Larisa Jackson III, MD (11/07/23 14:00:26)                   Impression:      1. Chronic changes are present as described above.  See above comments.  2. I see no lobar or segmental infiltrates or other significant abnormalities.      Electronically signed by: Larisa Jackson  Date:    11/07/2023  Time:    14:00               Narrative:    EXAMINATION:  STUDY: XR CHEST AP PORTABLE    CLINICAL HISTORY:  Victoriano Hoff, RT on 11/7/2023  1:39 PMX 1-2 DAYS  -WEAKNESS, SOB  -DARK STOOLS    COMPARISON:  None    FINDINGS:  A multilead pacemaker overlies left hemithorax with leads appearing well position.The heart is moderately enlarged with no significant vascular congestion or aarti decompensation.  The lungs are slightly under expanded.I see no lobar or segmental infiltrates.No significant pleural effusions are noted.There is mild demineralization of the skeletal structures with mild to moderate degenerative changes noted throughout the thoracic spine.  Atherosclerotic calcifications are noted within the thoracic aorta.  Surgical clips are noted within the left axilla.                                       Medications - No data to display  Medical Decision Making  1515: Dr. Alejandro will take over pts care.                ED Course as of 11/07/23 1525   Tue Nov 07, 2023   1413 X-Ray Chest AP Portable [KL]   1414 EKG 12-lead [KL]   1416 RBC(!): 2.93 [KL]      ED Course User Index  [KL] Rayna Bajwa FNP                      Clinical Impression:   Final diagnoses:  [R06.02] SOB (shortness of breath)  [R53.1] Weakness               Rayna Bajwa FNP  11/07/23 0848     None

## 2023-12-28 NOTE — PATIENT PROFILE ADULT - FUNCTIONAL ASSESSMENT - DAILY ACTIVITY SECTION LABEL
[FreeTextEntry1] : Diagnosis:: Type I supracondylar fracture left elbow healing well on xray today\par \par The history for today's visit was obtained from the child, as well as the parent. The child's history was unreliable alone due to age and therefore, the parent was used today as an independent historian.\par Xrays taken today out of the cast of the left elbow show healing response. Good overall alignment. No further immobilization is needed today. He is encouraged to do light activity. No climbing, sports until reevaluation in 3 weeks to check ROM. No xrays unless clinical concerns. All of the parents questions were addressed. They understood and agreed with the plan.\par \par I, Devika Billy, ELVIES, PAC have acted as scribe and documented the above for Dr. Milian\par \par The above documentation completed by the PA is an accurate record of both my words and actions. Toan Milian MD.\par \par 
.

## 2023-12-28 NOTE — H&P ADULT - NSHPSOCIALHISTORY_GEN_ALL_CORE
Lives at home with nephew in [place]  Works in [employer] Lives at home with nephew in [place]  Works in [employer]

## 2023-12-28 NOTE — ED PROVIDER NOTE - PROGRESS NOTE DETAILS
Patient signed out to Dr. Styles repeat WA prior to admission, final CT read is pending.  Patient received appropriate medications he is pending repeat labs including a repeat lactic

## 2023-12-28 NOTE — PROGRESS NOTE ADULT - SUBJECTIVE AND OBJECTIVE BOX
***Transfer to MICU from [facility]***    HOSPITAL COURSE:  The patient is a 43-year-old Male who denies PMH presented to [facility] due to 4 episodes of hematemesis. at home He states that when he drinks he usually finishes a bottle of vodka most weekend days. He states that his last drink was 2 days ago. He endorses a history of alcohol withdrawal symptoms including visual and auditory hallucinations, tremors, and nausea. He states that he had one seizure 7 years ago after drinking but was not evaluated because he felt that he recovered after. Also endorses that he was intubated one time for alcohol use. States that when he drinks he doesn't eat when he drinks, last meal was 2 days ago.  Endorses: HA 9/10, epigastric pain, tremor, auditory hallucinations when he closes his eyes, chest discomfort, SOB, hematemesis nausea  Denies: constipation, diarrhea, urinary symptoms      SUBJECTIVE/INTERVAL HPI: Patient was seen and examined at bedside, resting comfortably.     VITAL SIGNS:  T(F): 99.3 (12-28-23 @ 16:52)  HR: 107 (12-28-23 @ 16:52)  BP: 139/83 (12-28-23 @ 16:52)  RR: 26 (12-28-23 @ 16:52)  SpO2: 97% (12-28-23 @ 16:52)  Wt(kg): --        PHYSICAL EXAM:    Constitutional: resting comfortably in bed; NAD  HEENT: NC/AT, PER, anicteric sclera, no nasal discharge; MMM  Neck: supple; no JVD or thyromegaly  Respiratory: CTA B/L; no W/R/R, no retractions  Cardiac: +S1/S2; RRR; no M/R/G  Gastrointestinal: soft, NT/ND; no rebound or guarding  Back: spine midline, no bony tenderness or step-offs; no CVAT B/L  Extremities: WWP, no clubbing or cyanosis; no peripheral edema  Musculoskeletal: NROM x4; no joint swelling, tenderness or erythema  Vascular: 2+ radial, DP/PT pulses B/L  Dermatologic: skin warm, dry and intact; no rashes, wounds, or scars  Neurologic: AAOx3; CNII-XII grossly intact; no focal deficits  Psychiatric: affect and characteristics of appearance, verbalizations, behaviors are appropriate    MEDICATIONS  (STANDING):  cefTRIAXone   IVPB 1000 milliGRAM(s) IV Intermittent every 24 hours  dextrose 5% + sodium chloride 0.9%. 1000 milliLiter(s) (150 mL/Hr) IV Continuous <Continuous>  influenza   Vaccine 0.5 milliLiter(s) IntraMuscular once  multivitamin 1 Tablet(s) Oral daily  octreotide  Infusion 50 MICROgram(s)/Hr (10 mL/Hr) IV Continuous <Continuous>  pantoprazole  Injectable 40 milliGRAM(s) IV Push every 12 hours    MEDICATIONS  (PRN):  diazepam  Injectable 10 milliGRAM(s) IV Push every 4 hours PRN Symptom-triggered when CIWA-Ar score 8 or Greater  LORazepam   Injectable 2 milliGRAM(s) IV Push every 1 hour PRN Symptom-triggered: each CIWA -Ar score 8 or GREATER  melatonin 3 milliGRAM(s) Oral at bedtime PRN Insomnia      Allergies    No Known Allergies    Intolerances        LABS:                        10.1   4.59  )-----------( 30       ( 28 Dec 2023 18:20 )             29.8     12-28    134<L>  |  97  |  10  ----------------------------<  86  3.5   |  23  |  0.43<L>    Ca    7.9<L>      28 Dec 2023 18:20  Phos  2.2     12-28  Mg     1.2     12-28    TPro  8.0  /  Alb  3.9  /  TBili  1.7<H>  /  DBili  x   /  AST  98<H>  /  ALT  43  /  AlkPhos  163<H>  12-28    PT/INR - ( 28 Dec 2023 18:20 )   PT: 12.9 sec;   INR: 1.14          PTT - ( 28 Dec 2023 18:20 )  PTT:28.9 sec  Urinalysis Basic - ( 28 Dec 2023 18:20 )    Color: x / Appearance: x / SG: x / pH: x  Gluc: 86 mg/dL / Ketone: x  / Bili: x / Urobili: x   Blood: x / Protein: x / Nitrite: x   Leuk Esterase: x / RBC: x / WBC x   Sq Epi: x / Non Sq Epi: x / Bacteria: x        RADIOLOGY & ADDITIONAL TESTS:  Reviewed ***Transfer to MICU from [facility]***    HOSPITAL COURSE:  The patient is a 43-year-old Male who denies PMH presented to [facility] due to 4 episodes of hematemesis. at home He states that when he drinks he usually finishes a bottle of vodka most weekend days. He states that his last drink was 2 days ago. He endorses a history of alcohol withdrawal symptoms including visual and auditory hallucinations, tremors, and nausea. He states that he had one seizure 7 years ago after drinking but was not evaluated because he felt that he recovered after. Also endorses that he was intubated one time for alcohol use. States that when he drinks he doesn't eat when he drinks, last meal was 2 days ago.  Endorses: HA 9/10, epigastric pain, tremor, auditory hallucinations when he closes his eyes, chest discomfort, SOB, hematemesis nausea  Denies: constipation, diarrhea, urinary symptoms      SUBJECTIVE/INTERVAL HPI: Patient was seen and examined at bedside, resting comfortably.     VITAL SIGNS:  T(F): 99.3 (12-28-23 @ 16:52)  HR: 107 (12-28-23 @ 16:52)  BP: 139/83 (12-28-23 @ 16:52)  RR: 26 (12-28-23 @ 16:52)  SpO2: 97% (12-28-23 @ 16:52)  Wt(kg): --        PHYSICAL EXAM:    Constitutional: resting comfortably in bed; NAD  HEENT: NC/AT, PER, anicteric sclera, no nasal discharge; MMM  Neck: supple; no JVD or thyromegaly  Respiratory: CTA B/L; no W/R/R, no retractions  Cardiac: +S1/S2; RRR; no M/R/G  Gastrointestinal: soft, NT/ND; no rebound or guarding  Back: spine midline, no bony tenderness or step-offs; no CVAT B/L  Extremities: WWP, no clubbing or cyanosis; no peripheral edema  Musculoskeletal: NROM x4; no joint swelling, tenderness or erythema  Vascular: 2+ radial, DP/PT pulses B/L  Dermatologic: skin warm, dry and intact; no rashes, wounds, or scars  Neurologic: AAOx3; CNII-XII grossly intact; no focal deficits  Psychiatric: affect and characteristics of appearance, verbalizations, behaviors are appropriate    MEDICATIONS  (STANDING):  cefTRIAXone   IVPB 1000 milliGRAM(s) IV Intermittent every 24 hours  dextrose 5% + sodium chloride 0.9%. 1000 milliLiter(s) (150 mL/Hr) IV Continuous <Continuous>  influenza   Vaccine 0.5 milliLiter(s) IntraMuscular once  multivitamin 1 Tablet(s) Oral daily  octreotide  Infusion 50 MICROgram(s)/Hr (10 mL/Hr) IV Continuous <Continuous>  pantoprazole  Injectable 40 milliGRAM(s) IV Push every 12 hours    MEDICATIONS  (PRN):  diazepam  Injectable 10 milliGRAM(s) IV Push every 4 hours PRN Symptom-triggered when CIWA-Ar score 8 or Greater  LORazepam   Injectable 2 milliGRAM(s) IV Push every 1 hour PRN Symptom-triggered: each CIWA -Ar score 8 or GREATER  melatonin 3 milliGRAM(s) Oral at bedtime PRN Insomnia      Allergies    No Known Allergies    Intolerances        LABS:                        10.1   4.59  )-----------( 30       ( 28 Dec 2023 18:20 )             29.8     12-28    134<L>  |  97  |  10  ----------------------------<  86  3.5   |  23  |  0.43<L>    Ca    7.9<L>      28 Dec 2023 18:20  Phos  2.2     12-28  Mg     1.2     12-28    TPro  8.0  /  Alb  3.9  /  TBili  1.7<H>  /  DBili  x   /  AST  98<H>  /  ALT  43  /  AlkPhos  163<H>  12-28    PT/INR - ( 28 Dec 2023 18:20 )   PT: 12.9 sec;   INR: 1.14          PTT - ( 28 Dec 2023 18:20 )  PTT:28.9 sec  Urinalysis Basic - ( 28 Dec 2023 18:20 )    Color: x / Appearance: x / SG: x / pH: x  Gluc: 86 mg/dL / Ketone: x  / Bili: x / Urobili: x   Blood: x / Protein: x / Nitrite: x   Leuk Esterase: x / RBC: x / WBC x   Sq Epi: x / Non Sq Epi: x / Bacteria: x        RADIOLOGY & ADDITIONAL TESTS:  Reviewed ***Transfer to MICU from [facility]***    HOSPITAL COURSE:  The patient is a 43-year-old male with cirrhosis and alcohol use disorder with hx of withdrawal seizure (7 years ago) and intubation presented to [facility] for 4 episodes of hematemesis admitted for management of alcohol withdrawal. Initially pt had a CIWA >15, DUSTIN 350, , ALT 69, platelets 61. CTAP showed cirrhotic micronodular liver with portal hypertension, small diffuse varcies. Pt stepped up to ICU for CIWA >15, to start high risk CIWA protocol. GI consulted for hematemesis, started pt on octreotide drip and CTX ppx, plan for EGD on 12/29.       VITAL SIGNS:  T(F): 99.3 (12-28-23 @ 16:52)  HR: 107 (12-28-23 @ 16:52)  BP: 139/83 (12-28-23 @ 16:52)  RR: 26 (12-28-23 @ 16:52)  SpO2: 97% (12-28-23 @ 16:52)  Wt(kg): --        PHYSICAL EXAM:  General: NC/AT, lying in bed, not agitated, diaphoretic    HEENT:  NC/AT, EOMI, sclera anicteric, PERRL, horizontal nystagmus, tongue fasciculations no tongue wounds, dried blood around mouth          Lymphatic system: No LN  Lungs: Bilateral BS  Cardiovascular: tachycardia, regular rhythm,  nl s1, s2, no m/r/g appreciated  Gastrointestinal: abdomen soft, NTND, bowel sounds present  Musculoskeletal: No clubbing.  Moves all extremities.    Skin: Warm, dry, intact. No rashes noted.  Neurological: A&Ox3, strength 5/5 and sensation intact in all extremities     MEDICATIONS  (STANDING):  cefTRIAXone   IVPB 1000 milliGRAM(s) IV Intermittent every 24 hours  dextrose 5% + sodium chloride 0.9%. 1000 milliLiter(s) (150 mL/Hr) IV Continuous <Continuous>  influenza   Vaccine 0.5 milliLiter(s) IntraMuscular once  multivitamin 1 Tablet(s) Oral daily  octreotide  Infusion 50 MICROgram(s)/Hr (10 mL/Hr) IV Continuous <Continuous>  pantoprazole  Injectable 40 milliGRAM(s) IV Push every 12 hours    MEDICATIONS  (PRN):  diazepam  Injectable 10 milliGRAM(s) IV Push every 4 hours PRN Symptom-triggered when CIWA-Ar score 8 or Greater  LORazepam   Injectable 2 milliGRAM(s) IV Push every 1 hour PRN Symptom-triggered: each CIWA -Ar score 8 or GREATER  melatonin 3 milliGRAM(s) Oral at bedtime PRN Insomnia      Allergies    No Known Allergies    Intolerances        LABS:                        10.1   4.59  )-----------( 30       ( 28 Dec 2023 18:20 )             29.8     12-28    134<L>  |  97  |  10  ----------------------------<  86  3.5   |  23  |  0.43<L>    Ca    7.9<L>      28 Dec 2023 18:20  Phos  2.2     12-28  Mg     1.2     12-28    TPro  8.0  /  Alb  3.9  /  TBili  1.7<H>  /  DBili  x   /  AST  98<H>  /  ALT  43  /  AlkPhos  163<H>  12-28    PT/INR - ( 28 Dec 2023 18:20 )   PT: 12.9 sec;   INR: 1.14          PTT - ( 28 Dec 2023 18:20 )  PTT:28.9 sec  Urinalysis Basic - ( 28 Dec 2023 18:20 )    Color: x / Appearance: x / SG: x / pH: x  Gluc: 86 mg/dL / Ketone: x  / Bili: x / Urobili: x   Blood: x / Protein: x / Nitrite: x   Leuk Esterase: x / RBC: x / WBC x   Sq Epi: x / Non Sq Epi: x / Bacteria: x        RADIOLOGY & ADDITIONAL TESTS:  Reviewed ***Transfer to MICU from [facility]***    HOSPITAL COURSE:  The patient is a 43-year-old male with cirrhosis and alcohol use disorder with hx of withdrawal seizure (7 years ago) and intubation presented to [facility] for 4 episodes of hematemesis admitted for management of alcohol withdrawal. Initially pt had a CIWA >15, DUSTIN 350, , ALT 69, platelets 61. CTAP showed cirrhotic micronodular liver with portal hypertension, small diffuse varcies. Pt stepped up to ICU for CIWA >15, to start high risk CIWA protocol. GI consulted for hematemesis, started pt on octreotide drip and CTX ppx, plan for EGD on 12/29.       VITAL SIGNS:  T(F): 99.3 (12-28-23 @ 16:52)  HR: 107 (12-28-23 @ 16:52)  BP: 139/83 (12-28-23 @ 16:52)  RR: 26 (12-28-23 @ 16:52)  SpO2: 97% (12-28-23 @ 16:52)  Wt(kg): --        PHYSICAL EXAM:  General: NC/AT, lying in bed, not agitated, diaphoretic    HEENT:  NC/AT, EOMI, sclera anicteric, PERRL, horizontal nystagmus, tongue fasciculations no tongue wounds, dried blood around mouth          Lymphatic system: No LN  Lungs: Bilateral BS  Cardiovascular: tachycardia, regular rhythm,  nl s1, s2, no m/r/g appreciated  Gastrointestinal: abdomen soft, NTND, bowel sounds present  Musculoskeletal: No clubbing.  Moves all extremities.    Skin: Warm, dry, intact. No rashes noted.  Neurological: A&Ox3, strength 5/5 and sensation intact in all extremities     MEDICATIONS  (STANDING):  cefTRIAXone   IVPB 1000 milliGRAM(s) IV Intermittent every 24 hours  dextrose 5% + sodium chloride 0.9%. 1000 milliLiter(s) (150 mL/Hr) IV Continuous <Continuous>  influenza   Vaccine 0.5 milliLiter(s) IntraMuscular once  multivitamin 1 Tablet(s) Oral daily  octreotide  Infusion 50 MICROgram(s)/Hr (10 mL/Hr) IV Continuous <Continuous>  pantoprazole  Injectable 40 milliGRAM(s) IV Push every 12 hours    MEDICATIONS  (PRN):  diazepam  Injectable 10 milliGRAM(s) IV Push every 4 hours PRN Symptom-triggered when CIWA-Ar score 8 or Greater  LORazepam   Injectable 2 milliGRAM(s) IV Push every 1 hour PRN Symptom-triggered: each CIWA -Ar score 8 or GREATER  melatonin 3 milliGRAM(s) Oral at bedtime PRN Insomnia      Allergies    No Known Allergies    Intolerances        LABS:                        10.1   4.59  )-----------( 30       ( 28 Dec 2023 18:20 )             29.8     12-28    134<L>  |  97  |  10  ----------------------------<  86  3.5   |  23  |  0.43<L>    Ca    7.9<L>      28 Dec 2023 18:20  Phos  2.2     12-28  Mg     1.2     12-28    TPro  8.0  /  Alb  3.9  /  TBili  1.7<H>  /  DBili  x   /  AST  98<H>  /  ALT  43  /  AlkPhos  163<H>  12-28    PT/INR - ( 28 Dec 2023 18:20 )   PT: 12.9 sec;   INR: 1.14          PTT - ( 28 Dec 2023 18:20 )  PTT:28.9 sec  Urinalysis Basic - ( 28 Dec 2023 18:20 )    Color: x / Appearance: x / SG: x / pH: x  Gluc: 86 mg/dL / Ketone: x  / Bili: x / Urobili: x   Blood: x / Protein: x / Nitrite: x   Leuk Esterase: x / RBC: x / WBC x   Sq Epi: x / Non Sq Epi: x / Bacteria: x        RADIOLOGY & ADDITIONAL TESTS:  Reviewed

## 2023-12-28 NOTE — H&P ADULT - PROBLEM SELECTOR PLAN 2
Patient states that he had 4 episodes of hematemesis at home that were cup fulls.  Unclear history of varices     - NPO  - octreotide 50 mcg bolus bolus followed by a continuous infusion at a rate of 50 mcg per hour  - pantoprazole 40 mg IV BID  - ceftriaxone 1 g q24h for 7 days  - GI consult Patient states that he had 4 episodes of hematemesis at home that were cup fulls.  Unclear history of varices     - 2 large bore IV  - NPO  - octreotide 50 mcg bolus bolus followed by a continuous infusion at a rate of 50 mcg per hour  - pantoprazole 40 mg IV BID  - ceftriaxone 1 g q24h for 7 days  - GI consult  - maintain active type and screen  - transfuse for hgb <7

## 2023-12-28 NOTE — ED PROVIDER NOTE - PHYSICAL EXAMINATION
Gen: NAD, AOx3, able to make needs known, non-toxic  Head: NCAT  HEENT: EOMI, normal conjunctiva  Lung: CTAB, no respiratory distress, no wheezes/rhonchi/rales B/L, speaking in full sentences  CV: RRR, no M/R/G, pulses bilaterally   Abd: soft, +epigastric and LLQ tenderness, nondistended, no guarding, no CVA tenderness  MSK: no visible bony deformities  Neuro: No focal sensory or motor deficits  Skin: Warm, well perfused, no rash  Psych: normal affect

## 2023-12-28 NOTE — PATIENT PROFILE ADULT - FUNCTIONAL ASSESSMENT - BASIC MOBILITY 3.
[Normal Development] : development [No Elimination Concerns] : elimination [No feeding Concerns] : feeding [Normal Sleep Pattern] : sleep [Patient] : patient [Add Food/Vitamin] : Add Food/Vitamin: ~M [Social and Academic Competence] : social and academic competence [Emotional Well-Being] : emotional well-being [Risk Reduction] : risk reduction [Violence and Injury Prevention] : violence and injury prevention [de-identified] : Weight gain 11 pounds in 1 year [FreeTextEntry4] : Dysmenorrhea [de-identified] : Vitamin D3 1,000 IU [de-identified] : Moderate acne to face and back [FreeTextEntry7] : Topical acne medication [de-identified] : GYN [FreeTextEntry1] : 15 year old female patient presents for annual well visit.\par Normal PE except weight gain of 11 pounds (height increase by 1/2 inch.  She is motivated to lose weight.  We discussed increasing exercise and improvement in diet, portion control.\joel Has dysmenorrhea.   Mom states she made an appointment for a GYN; may be starting OCT.  \joel Was seen 1 year ago for acute visit for concerns of anxiety/depression, cutting (left wrist, superficial).  Parents had  1 year prior.  She denies she cuts any more, is not interested in seeing a therapist as she feels she gets the support she needs from her friends and boyfriend.  Can turn to her parents for help but more difficult to open up to mother.  Occasionally feels down but never thinks of suicide. \par \par \par Vaccines offered today: Menactra, Hepatitis A #1, Gardisil #1.  Mother refuses any vaccines.  I explained to mother that school will need Menactra this year. She wants to wait for letter from school. \par \par Routine lab work ordered.  Slips given.\par Return in 1 year for well visit. \par  \par We discussed the following health topics today:\par \par Discussed risks of tobacco, e-cigarettes, alcohol and drugs. We discussed avoiding situations in which these substances are available and having a plan if ever in a situation cannot be avoided.  Support friends who choose not to use tobacco,  drugs or alcohol.  Even small amounts of alcohol or drugs be dangerous.  Do not use alcohol or take drugs when diving or accepting a drive from someone who may be have have used these substances.  Binge drinking can cause serious consequences.  \par Discussed risks of pregnancy and STIs:  Abstinence is the safest protection from pregnancy and STIs. Plan to avoid risky places and relationships. If sexually active, you and your partner should correctly and consistently use a long-acting form of contraception such as birth control pills along with a condom.  \par We also discussed other safety measures such as always wearing a seatbelt, avoiding distracted driving, wearing head phones not too loud (acoustic damage), wearing sunscreen.\par \par \par  2 = A lot of assistance

## 2023-12-28 NOTE — CONSULT NOTE ADULT - SUBJECTIVE AND OBJECTIVE BOX
Patient is a 43y old  Male who presents with a chief complaint of alcohol withdrawal (28 Dec 2023 16:31)      Consult reason:  ED vitals:   T   HR   RR   BP   SpO2  Labs:  CXR:  EKG:   Interventions:  Consults:    HPI:  HPI: The patient is a 42 YO M who denies PMH presented to [facility] due to 4 episodes of hematemesis. at home He states that when he drinks he usually finishes a bottle of vodka most weekend days. He states that his last drink was 2 days ago. He endorses a history of alcohol withdrawal symptoms including visual and auditory hallucinations, tremors, and nausea. He states that he had one seizure 7 years ago after drinking but was not evaluated because he felt that he recovered after. Also endorses that he was intubated one time for alcohol use. States that when he drinks he doesn't eat when he drinks, last meal was 2 days ago.  Endorses: HA 9/10, epigastric pain, tremor, auditory hallucinations when he closes his eyes, chest discomfort, SOB, hematemesis nausea  Denies: constipation, diarrhea, urinary symptoms    In the ED:  Initial vital signs: T: 97.8 F, HR: 132, BP: 135/97, R: 16, SpO2: 98% on RA  ED course:   Labs: significant for blood alcohol 350, Alk Phos 231, , ALT 69, Cl 91, PLT 61, BUN 19, Lactate 5.4 -> 2.2, urine ketone moderate, hyaline casts, granular casts. hematuria, glucosuria 100  Imaging:  CXR: Clear lungs  CTAP: 1. No acute abdominal/pelvic pathology or primary etiology of pain.  2. Cirrhotic .micronodular liver with portal hypertension: diffuse small varices  3. Cholelithiasis  4. Distended urinary bladder (<500 cc). Correlate with normal voiding  EKG: sinus tachycardia  Medications: chlordiazePOXIDE 50 milliGRAM(s) Oral Once  folic acid Injectable 1 milliGRAM(s) IV Push Once  ondansetron Injectable 4 milliGRAM(s) IV Push once  pantoprazole  Injectable 40 milliGRAM(s) IV Push Once  pantoprazole  Injectable 40 milliGRAM(s) IV Push Once  sodium chloride 0.9% Bolus 1000 milliLiter(s) IV Bolus once  sodium chloride 0.9% Bolus 1000 milliLiter(s) IV Bolus once  thiamine Injectable 100 milliGRAM(s) IV Push Once    Consults: none     (28 Dec 2023 16:31)      ROS:  General:  Pulm:  CV:  GI:  :  Neuro:  MSK:  Heme:      PAST MEDICAL & SURGICAL HISTORY:  History of seizure due to alcohol withdrawal      No significant past surgical history          Home Medications:    MEDICATIONS  (STANDING):  cefTRIAXone   IVPB 1000 milliGRAM(s) IV Intermittent every 24 hours  dextrose 5% + sodium chloride 0.9%. 1000 milliLiter(s) (150 mL/Hr) IV Continuous <Continuous>  influenza   Vaccine 0.5 milliLiter(s) IntraMuscular once  multivitamin 1 Tablet(s) Oral daily  octreotide  Infusion 50 MICROgram(s)/Hr (10 mL/Hr) IV Continuous <Continuous>  octreotide  Injectable 50 MICROGram(s) IV Push once  pantoprazole  Injectable 40 milliGRAM(s) IV Push every 12 hours    MEDICATIONS  (PRN):  diazepam  Injectable 10 milliGRAM(s) IV Push every 4 hours PRN Symptom-triggered when CIWA-Ar score 8 or Greater  LORazepam   Injectable 2 milliGRAM(s) IV Push every 1 hour PRN Symptom-triggered: each CIWA -Ar score 8 or GREATER  melatonin 3 milliGRAM(s) Oral at bedtime PRN Insomnia      Allergies    No Known Allergies    Intolerances        SOCIAL HX:       FAMILY HISTORY:  FAMILY HISTORY:  :      PHYSICAL EXAM:    ICU Vital Signs Last 24 Hrs  T(C): 37.4 (28 Dec 2023 16:52), Max: 37.4 (28 Dec 2023 16:52)  T(F): 99.3 (28 Dec 2023 16:52), Max: 99.3 (28 Dec 2023 16:52)  HR: 107 (28 Dec 2023 16:52) (88 - 132)  BP: 139/83 (28 Dec 2023 16:52) (114/70 - 139/83)  BP(mean): 85 (28 Dec 2023 09:34) (85 - 85)  ABP: --  ABP(mean): --  RR: 26 (28 Dec 2023 16:52) (16 - 30)  SpO2: 97% (28 Dec 2023 16:52) (95% - 99%)    O2 Parameters below as of 28 Dec 2023 16:52  Patient On (Oxygen Delivery Method): room air            General: NC/AT, lying in bed   HEENT:  NC/AT, EOMI, sclera anicteric, PERRL       Lymphatic system: No LN  Lungs: Bilateral BS  Cardiovascular: RRR, nl s1, s2, no m/r/g appreciated  Gastrointestinal: abdomen soft, NTND, bowel sounds present  Musculoskeletal: No clubbing.  Moves all extremities.    Skin: Warm, dry, intact. No rashes noted.  Neurological: A&Ox3, strength 5/5 and sensation intact in all extremities         LABS:                          10.1   4.59  )-----------( 30       ( 28 Dec 2023 18:20 )             29.8                                               12-28    134<L>  |  97  |  10  ----------------------------<  86  3.5   |  23  |  0.43<L>    Ca    7.9<L>      28 Dec 2023 18:20  Phos  2.2     12-28  Mg     1.2     12-28    TPro  8.0  /  Alb  3.9  /  TBili  1.7<H>  /  DBili  x   /  AST  98<H>  /  ALT  43  /  AlkPhos  163<H>  12-28      PT/INR - ( 28 Dec 2023 18:20 )   PT: 12.9 sec;   INR: 1.14          PTT - ( 28 Dec 2023 18:20 )  PTT:28.9 sec                                       Urinalysis Basic - ( 28 Dec 2023 18:20 )    Color: x / Appearance: x / SG: x / pH: x  Gluc: 86 mg/dL / Ketone: x  / Bili: x / Urobili: x   Blood: x / Protein: x / Nitrite: x   Leuk Esterase: x / RBC: x / WBC x   Sq Epi: x / Non Sq Epi: x / Bacteria: x                                                  LIVER FUNCTIONS - ( 28 Dec 2023 18:20 )  Alb: 3.9 g/dL / Pro: 8.0 g/dL / ALK PHOS: 163 U/L / ALT: 43 U/L / AST: 98 U/L / GGT: x                                                                                                                                        Patient is a 43y old  Male who presents with a chief complaint of alcohol withdrawal (28 Dec 2023 16:31)      Consult reason:  ED vitals:   T   HR   RR   BP   SpO2  Labs:  CXR:  EKG:   Interventions:  Consults:    HPI:  HPI: The patient is a 42 YO M who denies PMH presented to [facility] due to 4 episodes of hematemesis. at home He states that when he drinks he usually finishes a bottle of vodka most weekend days. He states that his last drink was 2 days ago. He endorses a history of alcohol withdrawal symptoms including visual and auditory hallucinations, tremors, and nausea. He states that he had one seizure 7 years ago after drinking but was not evaluated because he felt that he recovered after. Also endorses that he was intubated one time for alcohol use. States that when he drinks he doesn't eat when he drinks, last meal was 2 days ago.  Endorses: HA 9/10, epigastric pain, tremor, auditory hallucinations when he closes his eyes, chest discomfort, SOB, hematemesis nausea  Denies: constipation, diarrhea, urinary symptoms    In the ED:  Initial vital signs: T: 97.8 F, HR: 132, BP: 135/97, R: 16, SpO2: 98% on RA  ED course:   Labs: significant for blood alcohol 350, Alk Phos 231, , ALT 69, Cl 91, PLT 61, BUN 19, Lactate 5.4 -> 2.2, urine ketone moderate, hyaline casts, granular casts. hematuria, glucosuria 100  Imaging:  CXR: Clear lungs  CTAP: 1. No acute abdominal/pelvic pathology or primary etiology of pain.  2. Cirrhotic .micronodular liver with portal hypertension: diffuse small varices  3. Cholelithiasis  4. Distended urinary bladder (<500 cc). Correlate with normal voiding  EKG: sinus tachycardia  Medications: chlordiazePOXIDE 50 milliGRAM(s) Oral Once  folic acid Injectable 1 milliGRAM(s) IV Push Once  ondansetron Injectable 4 milliGRAM(s) IV Push once  pantoprazole  Injectable 40 milliGRAM(s) IV Push Once  pantoprazole  Injectable 40 milliGRAM(s) IV Push Once  sodium chloride 0.9% Bolus 1000 milliLiter(s) IV Bolus once  sodium chloride 0.9% Bolus 1000 milliLiter(s) IV Bolus once  thiamine Injectable 100 milliGRAM(s) IV Push Once    Consults: none     (28 Dec 2023 16:31)      ROS:  General:  Pulm:  CV:  GI:  :  Neuro:  MSK:  Heme:      PAST MEDICAL & SURGICAL HISTORY:  History of seizure due to alcohol withdrawal      No significant past surgical history          Home Medications:    MEDICATIONS  (STANDING):  cefTRIAXone   IVPB 1000 milliGRAM(s) IV Intermittent every 24 hours  dextrose 5% + sodium chloride 0.9%. 1000 milliLiter(s) (150 mL/Hr) IV Continuous <Continuous>  influenza   Vaccine 0.5 milliLiter(s) IntraMuscular once  multivitamin 1 Tablet(s) Oral daily  octreotide  Infusion 50 MICROgram(s)/Hr (10 mL/Hr) IV Continuous <Continuous>  octreotide  Injectable 50 MICROGram(s) IV Push once  pantoprazole  Injectable 40 milliGRAM(s) IV Push every 12 hours    MEDICATIONS  (PRN):  diazepam  Injectable 10 milliGRAM(s) IV Push every 4 hours PRN Symptom-triggered when CIWA-Ar score 8 or Greater  LORazepam   Injectable 2 milliGRAM(s) IV Push every 1 hour PRN Symptom-triggered: each CIWA -Ar score 8 or GREATER  melatonin 3 milliGRAM(s) Oral at bedtime PRN Insomnia      Allergies    No Known Allergies    Intolerances        SOCIAL HX:       FAMILY HISTORY:  FAMILY HISTORY:  :      PHYSICAL EXAM:    ICU Vital Signs Last 24 Hrs  T(C): 37.4 (28 Dec 2023 16:52), Max: 37.4 (28 Dec 2023 16:52)  T(F): 99.3 (28 Dec 2023 16:52), Max: 99.3 (28 Dec 2023 16:52)  HR: 107 (28 Dec 2023 16:52) (88 - 132)  BP: 139/83 (28 Dec 2023 16:52) (114/70 - 139/83)  BP(mean): 85 (28 Dec 2023 09:34) (85 - 85)  ABP: --  ABP(mean): --  RR: 26 (28 Dec 2023 16:52) (16 - 30)  SpO2: 97% (28 Dec 2023 16:52) (95% - 99%)    O2 Parameters below as of 28 Dec 2023 16:52  Patient On (Oxygen Delivery Method): room air            General: NC/AT, lying in bed   HEENT:  NC/AT, EOMI, sclera anicteric, PERRL       Lymphatic system: No LN  Lungs: Bilateral BS  Cardiovascular: RRR, nl s1, s2, no m/r/g appreciated  Gastrointestinal: abdomen soft, NTND, bowel sounds present  Musculoskeletal: No clubbing.  Moves all extremities.    Skin: Warm, dry, intact. No rashes noted.  Neurological: A&Ox3, strength 5/5 and sensation intact in all extremities         LABS:                          10.1   4.59  )-----------( 30       ( 28 Dec 2023 18:20 )             29.8                                               12-28    134<L>  |  97  |  10  ----------------------------<  86  3.5   |  23  |  0.43<L>    Ca    7.9<L>      28 Dec 2023 18:20  Phos  2.2     12-28  Mg     1.2     12-28    TPro  8.0  /  Alb  3.9  /  TBili  1.7<H>  /  DBili  x   /  AST  98<H>  /  ALT  43  /  AlkPhos  163<H>  12-28      PT/INR - ( 28 Dec 2023 18:20 )   PT: 12.9 sec;   INR: 1.14          PTT - ( 28 Dec 2023 18:20 )  PTT:28.9 sec                                       Urinalysis Basic - ( 28 Dec 2023 18:20 )    Color: x / Appearance: x / SG: x / pH: x  Gluc: 86 mg/dL / Ketone: x  / Bili: x / Urobili: x   Blood: x / Protein: x / Nitrite: x   Leuk Esterase: x / RBC: x / WBC x   Sq Epi: x / Non Sq Epi: x / Bacteria: x                                                  LIVER FUNCTIONS - ( 28 Dec 2023 18:20 )  Alb: 3.9 g/dL / Pro: 8.0 g/dL / ALK PHOS: 163 U/L / ALT: 43 U/L / AST: 98 U/L / GGT: x                                                                                                                                        Patient is a 43y old  Male who presents with a chief complaint of alcohol withdrawal (28 Dec 2023 16:31)    Consult reason: ETOH withdrawal     HPI:  HPI: The patient is a 42 YO M who denies PMH other than ETOH abuse disorder with a hx of withdrawal sz x1 presented to [facility] due to 4 episodes of hematemesis at home. He states that each episode was with dark blood and this has happened once before about 7 years ago. He drinks usually a bottle of vodka once a week and last drink was 2 days ago. He denies other drug or tobacco use. He states that 7 years ago he had to be intubated for alcohol withdrawal at that time having visual and auditory hallucinations, tremors, and nausea with a sz episode. He states he was drinking almost daily at that time. He c/o mild epigastric pain. On initial evaluation patient had a CIWA >15 w/ sx of anxiety, headache, nausea, visual hallucinations stating he sees a face on the wall and auditory hallucinations when he closes his eyes and is oriented to place and date. He denies BRBPR and history of colonoscopy. Denies dyspnea, chest pain, or back pain.      In the ED:  Initial vital signs: T: 97.8 F, HR: 132, BP: 135/97, R: 16, SpO2: 98% on RA  ED course:   Labs: significant for blood alcohol 350, Alk Phos 231, , ALT 69, Cl 91, PLT 61, BUN 19, Lactate 5.4 -> 2.2, urine ketone moderate, hyaline casts, granular casts. hematuria, glucosuria 100  Imaging:  CXR: Clear lungs  CTAP: 1. No acute abdominal/pelvic pathology or primary etiology of pain.  2. Cirrhotic .micronodular liver with portal hypertension: diffuse small varices  3. Cholelithiasis  4. Distended urinary bladder (<500 cc). Correlate with normal voiding  EKG: sinus tachycardia  Medications: chlordiazePOXIDE 50 milliGRAM(s) Oral Once  folic acid Injectable 1 milliGRAM(s) IV Push Once  ondansetron Injectable 4 milliGRAM(s) IV Push once  pantoprazole  Injectable 40 milliGRAM(s) IV Push Once  pantoprazole  Injectable 40 milliGRAM(s) IV Push Once  sodium chloride 0.9% Bolus 1000 milliLiter(s) IV Bolus once  sodium chloride 0.9% Bolus 1000 milliLiter(s) IV Bolus once  thiamine Injectable 100 milliGRAM(s) IV Push Once    On the floor patient was given valium 10mg and ativan 2mg. About 25 minutes after patient was sedated well and appeared comfortable.     Consults: none      (28 Dec 2023 16:31)      ROS: All negative except as noted in HPI       PAST MEDICAL & SURGICAL HISTORY:  History of seizure due to alcohol withdrawal      No significant past surgical history      Home Medications:    MEDICATIONS  (STANDING):  cefTRIAXone   IVPB 1000 milliGRAM(s) IV Intermittent every 24 hours  dextrose 5% + sodium chloride 0.9%. 1000 milliLiter(s) (150 mL/Hr) IV Continuous <Continuous>  influenza   Vaccine 0.5 milliLiter(s) IntraMuscular once  multivitamin 1 Tablet(s) Oral daily  octreotide  Infusion 50 MICROgram(s)/Hr (10 mL/Hr) IV Continuous <Continuous>  octreotide  Injectable 50 MICROGram(s) IV Push once  pantoprazole  Injectable 40 milliGRAM(s) IV Push every 12 hours    MEDICATIONS  (PRN):  diazepam  Injectable 10 milliGRAM(s) IV Push every 4 hours PRN Symptom-triggered when CIWA-Ar score 8 or Greater  LORazepam   Injectable 2 milliGRAM(s) IV Push every 1 hour PRN Symptom-triggered: each CIWA -Ar score 8 or GREATER  melatonin 3 milliGRAM(s) Oral at bedtime PRN Insomnia      Allergies    No Known Allergies    Intolerances    SOCIAL HX: 1 bottle of vodka per week.   Denies drug use or tobacco use   Works in construction        FAMILY HISTORY:  FAMILY HISTORY:  :      PHYSICAL EXAM:    ICU Vital Signs Last 24 Hrs  T(C): 37.4 (28 Dec 2023 16:52), Max: 37.4 (28 Dec 2023 16:52)  T(F): 99.3 (28 Dec 2023 16:52), Max: 99.3 (28 Dec 2023 16:52)  HR: 107 (28 Dec 2023 16:52) (88 - 132)  BP: 139/83 (28 Dec 2023 16:52) (114/70 - 139/83)  BP(mean): 85 (28 Dec 2023 09:34) (85 - 85)  ABP: --  ABP(mean): --  RR: 26 (28 Dec 2023 16:52) (16 - 30)  SpO2: 97% (28 Dec 2023 16:52) (95% - 99%)    O2 Parameters below as of 28 Dec 2023 16:52  Patient On (Oxygen Delivery Method): room air    General: NC/AT, lying in bed, not agitated, diaphoretic    HEENT:  NC/AT, EOMI, sclera anicteric, PERRL, horizontal nystagmus, tongue fasciculations no tongue wounds, dried blood around mouth          Lymphatic system: No LN  Lungs: Bilateral BS  Cardiovascular: tachycardia, regular rhythm,  nl s1, s2, no m/r/g appreciated  Gastrointestinal: abdomen soft, NTND, bowel sounds present  Musculoskeletal: No clubbing.  Moves all extremities.    Skin: Warm, dry, intact. No rashes noted.  Neurological: A&Ox3, strength 5/5 and sensation intact in all extremities     Prior to benzo: physical exam showed tremors with arm extension         LABS:                          10.1   4.59  )-----------( 30       ( 28 Dec 2023 18:20 )             29.8                                               12-28    134<L>  |  97  |  10  ----------------------------<  86  3.5   |  23  |  0.43<L>    Ca    7.9<L>      28 Dec 2023 18:20  Phos  2.2     12-28  Mg     1.2     12-28    TPro  8.0  /  Alb  3.9  /  TBili  1.7<H>  /  DBili  x   /  AST  98<H>  /  ALT  43  /  AlkPhos  163<H>  12-28      PT/INR - ( 28 Dec 2023 18:20 )   PT: 12.9 sec;   INR: 1.14          PTT - ( 28 Dec 2023 18:20 )  PTT:28.9 sec                                       Urinalysis Basic - ( 28 Dec 2023 18:20 )    Color: x / Appearance: x / SG: x / pH: x  Gluc: 86 mg/dL / Ketone: x  / Bili: x / Urobili: x   Blood: x / Protein: x / Nitrite: x   Leuk Esterase: x / RBC: x / WBC x   Sq Epi: x / Non Sq Epi: x / Bacteria: x                                                  LIVER FUNCTIONS - ( 28 Dec 2023 18:20 )  Alb: 3.9 g/dL / Pro: 8.0 g/dL / ALK PHOS: 163 U/L / ALT: 43 U/L / AST: 98 U/L / GGT: x                                                                                                                                        Patient is a 43y old  Male who presents with a chief complaint of alcohol withdrawal (28 Dec 2023 16:31)    Consult reason: ETOH withdrawal     HPI:  HPI: The patient is a 42 YO M who denies PMH other than ETOH abuse disorder with a hx of withdrawal sz x1 presented to [facility] due to 4 episodes of hematemesis at home. He states that each episode was with dark blood and this has happened once before about 7 years ago. He drinks usually a bottle of vodka once a week and last drink was 2 days ago. He denies other drug or tobacco use. He states that 7 years ago he had to be intubated for alcohol withdrawal at that time having visual and auditory hallucinations, tremors, and nausea with a sz episode. He states he was drinking almost daily at that time. He c/o mild epigastric pain. On initial evaluation patient had a CIWA >15 w/ sx of anxiety, headache, nausea, visual hallucinations stating he sees a face on the wall and auditory hallucinations when he closes his eyes and is oriented to place and date. He denies BRBPR and history of colonoscopy. Denies dyspnea, chest pain, or back pain.      In the ED:  Initial vital signs: T: 97.8 F, HR: 132, BP: 135/97, R: 16, SpO2: 98% on RA  ED course:   Labs: significant for blood alcohol 350, Alk Phos 231, , ALT 69, Cl 91, PLT 61, BUN 19, Lactate 5.4 -> 2.2, urine ketone moderate, hyaline casts, granular casts. hematuria, glucosuria 100  Imaging:  CXR: Clear lungs  CTAP: 1. No acute abdominal/pelvic pathology or primary etiology of pain.  2. Cirrhotic .micronodular liver with portal hypertension: diffuse small varices  3. Cholelithiasis  4. Distended urinary bladder (<500 cc). Correlate with normal voiding  EKG: sinus tachycardia  Medications: chlordiazePOXIDE 50 milliGRAM(s) Oral Once  folic acid Injectable 1 milliGRAM(s) IV Push Once  ondansetron Injectable 4 milliGRAM(s) IV Push once  pantoprazole  Injectable 40 milliGRAM(s) IV Push Once  pantoprazole  Injectable 40 milliGRAM(s) IV Push Once  sodium chloride 0.9% Bolus 1000 milliLiter(s) IV Bolus once  sodium chloride 0.9% Bolus 1000 milliLiter(s) IV Bolus once  thiamine Injectable 100 milliGRAM(s) IV Push Once    On the floor patient was given valium 10mg and ativan 2mg. About 25 minutes after patient was sedated well and appeared comfortable.     Consults: none      (28 Dec 2023 16:31)      ROS: All negative except as noted in HPI       PAST MEDICAL & SURGICAL HISTORY:  History of seizure due to alcohol withdrawal      No significant past surgical history      Home Medications:    MEDICATIONS  (STANDING):  cefTRIAXone   IVPB 1000 milliGRAM(s) IV Intermittent every 24 hours  dextrose 5% + sodium chloride 0.9%. 1000 milliLiter(s) (150 mL/Hr) IV Continuous <Continuous>  influenza   Vaccine 0.5 milliLiter(s) IntraMuscular once  multivitamin 1 Tablet(s) Oral daily  octreotide  Infusion 50 MICROgram(s)/Hr (10 mL/Hr) IV Continuous <Continuous>  octreotide  Injectable 50 MICROGram(s) IV Push once  pantoprazole  Injectable 40 milliGRAM(s) IV Push every 12 hours    MEDICATIONS  (PRN):  diazepam  Injectable 10 milliGRAM(s) IV Push every 4 hours PRN Symptom-triggered when CIWA-Ar score 8 or Greater  LORazepam   Injectable 2 milliGRAM(s) IV Push every 1 hour PRN Symptom-triggered: each CIWA -Ar score 8 or GREATER  melatonin 3 milliGRAM(s) Oral at bedtime PRN Insomnia      Allergies    No Known Allergies    Intolerances    SOCIAL HX: 1 bottle of vodka per week.   Denies drug use or tobacco use   Works in construction        FAMILY HISTORY:  FAMILY HISTORY:  :      PHYSICAL EXAM:    ICU Vital Signs Last 24 Hrs  T(C): 37.4 (28 Dec 2023 16:52), Max: 37.4 (28 Dec 2023 16:52)  T(F): 99.3 (28 Dec 2023 16:52), Max: 99.3 (28 Dec 2023 16:52)  HR: 107 (28 Dec 2023 16:52) (88 - 132)  BP: 139/83 (28 Dec 2023 16:52) (114/70 - 139/83)  BP(mean): 85 (28 Dec 2023 09:34) (85 - 85)  ABP: --  ABP(mean): --  RR: 26 (28 Dec 2023 16:52) (16 - 30)  SpO2: 97% (28 Dec 2023 16:52) (95% - 99%)    O2 Parameters below as of 28 Dec 2023 16:52  Patient On (Oxygen Delivery Method): room air    General: NC/AT, lying in bed, not agitated, diaphoretic    HEENT:  NC/AT, EOMI, sclera anicteric, PERRL, horizontal nystagmus, tongue fasciculations no tongue wounds, dried blood around mouth          Lymphatic system: No LN  Lungs: Bilateral BS  Cardiovascular: tachycardia, regular rhythm,  nl s1, s2, no m/r/g appreciated  Gastrointestinal: abdomen soft, NTND, bowel sounds present  Musculoskeletal: No clubbing.  Moves all extremities.    Skin: Warm, dry, intact. No rashes noted.  Neurological: A&Ox3, strength 5/5 and sensation intact in all extremities     Prior to benzo: physical exam showed tremors with arm extension         LABS:                          10.1   4.59  )-----------( 30       ( 28 Dec 2023 18:20 )             29.8                                               12-28    134<L>  |  97  |  10  ----------------------------<  86  3.5   |  23  |  0.43<L>    Ca    7.9<L>      28 Dec 2023 18:20  Phos  2.2     12-28  Mg     1.2     12-28    TPro  8.0  /  Alb  3.9  /  TBili  1.7<H>  /  DBili  x   /  AST  98<H>  /  ALT  43  /  AlkPhos  163<H>  12-28      PT/INR - ( 28 Dec 2023 18:20 )   PT: 12.9 sec;   INR: 1.14          PTT - ( 28 Dec 2023 18:20 )  PTT:28.9 sec                                       Urinalysis Basic - ( 28 Dec 2023 18:20 )    Color: x / Appearance: x / SG: x / pH: x  Gluc: 86 mg/dL / Ketone: x  / Bili: x / Urobili: x   Blood: x / Protein: x / Nitrite: x   Leuk Esterase: x / RBC: x / WBC x   Sq Epi: x / Non Sq Epi: x / Bacteria: x                                                  LIVER FUNCTIONS - ( 28 Dec 2023 18:20 )  Alb: 3.9 g/dL / Pro: 8.0 g/dL / ALK PHOS: 163 U/L / ALT: 43 U/L / AST: 98 U/L / GGT: x

## 2023-12-28 NOTE — CHART NOTE - NSCHARTNOTEFT_GEN_A_CORE
Patient is a 43M with alcohol use disorder and cirrhosis presenting to [facility] overnight with etoh intoxication and complaining of hematemesis. MELD 12. Found to have plt 30, varices on CT. Now in alcohol withdrawal. No observed bleeding since arrival to ED 14h ago.    Recommendations:  - Resuscitation and treatment of alcohol withdrawal per primary team  - Agree with plan to transfer to MICU  - PPI  - Octreotide drip  - Ceftriaxone 1g x 7 days for SBP PPX  - Keep NPO    Discussed with primary team and service attending Patient is a 43M with alcohol use disorder and cirrhosis presenting to [facility] overnight with etoh intoxication and complaining of hematemesis. MELD 12. Found to have plt 30, varices on CT. Now in alcohol withdrawal. No observed bleeding since arrival to ED 14h ago.    Recommendations:  - Resuscitation and treatment of alcohol withdrawal per primary team  - Agree with plan to transfer to MICU  - PPI  - Octreotide drip  - Ceftriaxone 1g x 7 days for SBP PPX  - Keep NPO    Discussed with primary team and service attending

## 2023-12-28 NOTE — ED PROVIDER NOTE - OBJECTIVE STATEMENT
43-year-old man with PMH of alcohol withdrawal seizures, presenting due to 4 bouts of hematemesis yesterday.  Patient states that he usually drinks a bottle of vodka every day over the weekends, last drink was 2 days ago.  Has a history of alcohol withdrawal symptoms that included visual and auditory hallucinations, tremors, nausea, seizures.  Patient states that he has only had seizures 1 time in the past, was not evaluated for this.  Endorsing epigastric pain.

## 2023-12-28 NOTE — H&P ADULT - ASSESSMENT
The patient is a 42 YO M who with PMH of alcohol withdrawal seizure and intubation presented to [facility] for 4 episodes of hematemesis admitted for management of alcohol withdrawal.  The patient is a 44 YO M who with PMH of alcohol withdrawal seizure and intubation presented to [facility] for 4 episodes of hematemesis admitted for management of alcohol withdrawal.

## 2023-12-28 NOTE — PROGRESS NOTE ADULT - ATTENDING COMMENTS
Severe alcohol withdrawal with hematemesis  physical as above  add octreotide and protonix infusions  serial CBC  CIWA driven protocol with ativan

## 2023-12-28 NOTE — ED PROVIDER NOTE - NSICDXNOPASTMEDICALHX_GEN_ALL_ED
[facility] VISIT NOTE       Chief Complaint   Patient presents with   • Sore Throat     Sore throat x1 week, worse at night, trouble sleeping. Right ear pain beginning yesterday. Denies fever.       History Of Present Illness  Margoth is a 49 year old female presenting with sore throat for a week.  Right ear started hurting last night.  Throat is more sore on the right. No fever, chills, runny nose, or sinus congestion.  Slight dry cough. .       Past Medical History  History reviewed. No pertinent past medical history.     Surgical History  Past Surgical History:   Procedure Laterality Date   • Breast surgery          Social History  Social History     Tobacco Use   • Smoking status: Never   • Smokeless tobacco: Never   Vaping Use   • Vaping Use: never used   Substance Use Topics   • Alcohol use: Yes     Comment: 8 drinks a week   • Drug use: Never       Family History    Family History   Problem Relation Age of Onset   • Cancer Mother    • Blood Disorder Father         FH reviewed and negative for any heart or lung disease, bleeding disorders, or issues related to this complaint.     Allergies  ALLERGIES:  Patient has no known allergies.    Medications  Current Outpatient Medications   Medication Sig   • amoxicillin (AMOXIL) 875 MG tablet Take 1 tablet by mouth in the morning and 1 tablet in the evening. Do all this for 10 days.     No current facility-administered medications for this visit.        Review of Systems  Constitutional: Negative for fever and chills.  Skin: Negative for rash.  HEENT: Negative for eye drainage, rhinorrhea, sinus congestion. Positive for  ear pain and sore throat.  Respiratory: Negative wheezing, shortness of breath. Positive for slight cough.  Cardiovascular: Negative for chest pain, chest pressure, palpitations or diaphoresis.  Gastrointestinal: Negative for nausea, vomiting, diarrhea or abdominal pain.  Genitourinary: Negative for dysuria, urgency, frequency, hematuria or flank  pain.  Extremities:  Negative for joint swelling or joint pain.  Neurologic:  Negative for change in sensory or motor function.  Negative for headache.  Endocrine: Negative for heat or cold intolerance, weight loss or gain.  Hematological: Negative for bleeding, bruising or adenopathy.  Psychiatric: Negative for change in affect, change in mentation or sleep disturbance.  All other systems reviewed and otherwise negative unless noted.      Last Recorded Vitals  Vitals:    12/28/22 1020 12/28/22 1021 12/28/22 1023 12/28/22 1051   BP: (!) 133/92 (!) 142/91     BP Location: RUE - Right upper extremity      Patient Position: Sitting      Pulse: 79 77     Resp: 14      Temp: 98 °F (36.7 °C)      TempSrc: Temporal      SpO2: 99%      Weight: 62.6 kg (138 lb)      Height: 5' 7\" (1.702 m)      PainSc: 7   7  7    PainLoc: Throat  Throat Throat        Physical Exam  Vitals and nursing note reviewed.   Constitutional:       General: She is not in acute distress.     Appearance: Normal appearance. She is not ill-appearing.   HENT:      Head: Normocephalic and atraumatic.      Right Ear: Hearing, tympanic membrane, ear canal and external ear normal.      Left Ear: Hearing, tympanic membrane, ear canal and external ear normal.      Nose: Nose normal.      Mouth/Throat:      Mouth: Mucous membranes are moist.      Pharynx: Uvula midline. Pharyngeal swelling and posterior oropharyngeal erythema present. No oropharyngeal exudate or uvula swelling.      Tonsils: No tonsillar exudate.   Eyes:      Conjunctiva/sclera: Conjunctivae normal.      Pupils: Pupils are equal, round, and reactive to light.   Cardiovascular:      Rate and Rhythm: Normal rate and regular rhythm.      Heart sounds: Normal heart sounds.   Pulmonary:      Effort: Pulmonary effort is normal. No respiratory distress.      Breath sounds: Normal breath sounds. No wheezing.   Musculoskeletal:         General: Normal range of motion.      Cervical back: Normal range of  motion and neck supple.   Lymphadenopathy:      Cervical: No cervical adenopathy.   Skin:     General: Skin is warm and dry.      Findings: No ecchymosis or lesion.   Neurological:      Mental Status: She is alert.      Gait: Gait is intact.   Psychiatric:         Mood and Affect: Mood and affect normal.         Judgment: Judgment normal.              Labs  Results for orders placed or performed in visit on 12/28/22   POCT RAPID STREP A   Result Value Ref Range    GRP A STREP Positive (A) Negative    Internal Procedural Controls Acceptable Yes     TEST LOT NUMBER [id number]     TEST LOT EXPIRATION DATE 2/29/2024         Differential Diagnosis/MDM:  Diagnoses that have been ruled out:   None   Diagnoses that are still under consideration:   Strep   Final diagnoses:   1. Strep throat    2. Elevated blood pressure reading            Diagnosis:   (J02.0) Strep throat  (primary encounter diagnosis)  Plan: POCT RAPID STREP A, amoxicillin (AMOXIL) 875 MG        tablet    (R03.0) Elevated blood pressure reading      New Prescriptions    AMOXICILLIN (AMOXIL) 875 MG TABLET    Take 1 tablet by mouth in the morning and 1 tablet in the evening. Do all this for 10 days.       Follow-up Information     Follow up With Specialties Details Why Contact Info    Hali Landers MD Family Practice Follow up in 1 week(s) For elevated blood pressure., For recheck [address]  [address]  [facility] [address] [phone]              Patient Instructions   Tylenol or Ibuprofen  Fluids  Warm salt water gargles  Chloraseptic   Follow up with your PCP within a week for your elevated blood pressure reading.   Get your COVID booster as soon as possible.  Also, strongly suggest annual flu shot.     Thank you for choosing [facility] for your healthcare needs.    We strive to provide you with excellent service and hope that we have exceeded your expectations.     If you receive a survey in the mail, we hope that you  will complete it and agree that we have provided \"Very Good\" care today.  If you would like to speak to us about your visit, please contact our center at:    [facility]  [phone]    [facility]  [phone]    [facility]  [phone]        Primary Care Physician  Verify Pcp      Arben Munoz MD       <-- Click to add NO pertinent Past Medical History

## 2023-12-28 NOTE — ED ADULT NURSE NOTE - CHPI ED NUR SYMPTOMS NEG
·   No acute exacerbations reported  · Continue oxygen at night as needed  · Continue DuoNebs and p r n  albuterol no abdominal distension/no blood in stool/no burning urination/no chills/no diarrhea/no dysuria/no fever/no hematuria

## 2023-12-28 NOTE — ED ADULT NURSE NOTE - CHPI ED NUR CONTEXT2
alcohol related Finasteride Pregnancy And Lactation Text: This medication is absolutely contraindicated during pregnancy. It is unknown if it is excreted in breast milk.

## 2023-12-29 DIAGNOSIS — K72.90 HEPATIC FAILURE, UNSPECIFIED WITHOUT COMA: ICD-10-CM

## 2023-12-29 LAB
ALBUMIN SERPL ELPH-MCNC: 3.6 G/DL — SIGNIFICANT CHANGE UP (ref 3.3–5)
ALBUMIN SERPL ELPH-MCNC: 3.6 G/DL — SIGNIFICANT CHANGE UP (ref 3.3–5)
ALP SERPL-CCNC: 148 U/L — HIGH (ref 40–120)
ALP SERPL-CCNC: 148 U/L — HIGH (ref 40–120)
ALT FLD-CCNC: 37 U/L — SIGNIFICANT CHANGE UP (ref 10–45)
ALT FLD-CCNC: 37 U/L — SIGNIFICANT CHANGE UP (ref 10–45)
ANION GAP SERPL CALC-SCNC: 14 MMOL/L — SIGNIFICANT CHANGE UP (ref 5–17)
ANION GAP SERPL CALC-SCNC: 14 MMOL/L — SIGNIFICANT CHANGE UP (ref 5–17)
AST SERPL-CCNC: 79 U/L — HIGH (ref 10–40)
AST SERPL-CCNC: 79 U/L — HIGH (ref 10–40)
BASE EXCESS BLDA CALC-SCNC: 4.1 MMOL/L — HIGH (ref -2–3)
BASE EXCESS BLDA CALC-SCNC: 4.1 MMOL/L — HIGH (ref -2–3)
BASOPHILS # BLD AUTO: 0.03 K/UL — SIGNIFICANT CHANGE UP (ref 0–0.2)
BASOPHILS # BLD AUTO: 0.03 K/UL — SIGNIFICANT CHANGE UP (ref 0–0.2)
BASOPHILS NFR BLD AUTO: 0.7 % — SIGNIFICANT CHANGE UP (ref 0–2)
BASOPHILS NFR BLD AUTO: 0.7 % — SIGNIFICANT CHANGE UP (ref 0–2)
BILIRUB SERPL-MCNC: 1.9 MG/DL — HIGH (ref 0.2–1.2)
BILIRUB SERPL-MCNC: 1.9 MG/DL — HIGH (ref 0.2–1.2)
BUN SERPL-MCNC: 7 MG/DL — SIGNIFICANT CHANGE UP (ref 7–23)
BUN SERPL-MCNC: 7 MG/DL — SIGNIFICANT CHANGE UP (ref 7–23)
CALCIUM SERPL-MCNC: 7.6 MG/DL — LOW (ref 8.4–10.5)
CALCIUM SERPL-MCNC: 7.6 MG/DL — LOW (ref 8.4–10.5)
CHLORIDE SERPL-SCNC: 96 MMOL/L — SIGNIFICANT CHANGE UP (ref 96–108)
CHLORIDE SERPL-SCNC: 96 MMOL/L — SIGNIFICANT CHANGE UP (ref 96–108)
CO2 BLDA-SCNC: 28 MMOL/L — HIGH (ref 19–24)
CO2 BLDA-SCNC: 28 MMOL/L — HIGH (ref 19–24)
CO2 SERPL-SCNC: 22 MMOL/L — SIGNIFICANT CHANGE UP (ref 22–31)
CO2 SERPL-SCNC: 22 MMOL/L — SIGNIFICANT CHANGE UP (ref 22–31)
CREAT SERPL-MCNC: 0.44 MG/DL — LOW (ref 0.5–1.3)
CREAT SERPL-MCNC: 0.44 MG/DL — LOW (ref 0.5–1.3)
CULTURE RESULTS: NO GROWTH — SIGNIFICANT CHANGE UP
CULTURE RESULTS: NO GROWTH — SIGNIFICANT CHANGE UP
EGFR: 134 ML/MIN/1.73M2 — SIGNIFICANT CHANGE UP
EGFR: 134 ML/MIN/1.73M2 — SIGNIFICANT CHANGE UP
EOSINOPHIL # BLD AUTO: 0.05 K/UL — SIGNIFICANT CHANGE UP (ref 0–0.5)
EOSINOPHIL # BLD AUTO: 0.05 K/UL — SIGNIFICANT CHANGE UP (ref 0–0.5)
EOSINOPHIL NFR BLD AUTO: 1.1 % — SIGNIFICANT CHANGE UP (ref 0–6)
EOSINOPHIL NFR BLD AUTO: 1.1 % — SIGNIFICANT CHANGE UP (ref 0–6)
GLUCOSE BLDC GLUCOMTR-MCNC: 141 MG/DL — HIGH (ref 70–99)
GLUCOSE BLDC GLUCOMTR-MCNC: 141 MG/DL — HIGH (ref 70–99)
GLUCOSE SERPL-MCNC: 144 MG/DL — HIGH (ref 70–99)
GLUCOSE SERPL-MCNC: 144 MG/DL — HIGH (ref 70–99)
HCO3 BLDA-SCNC: 27 MMOL/L — SIGNIFICANT CHANGE UP (ref 21–28)
HCO3 BLDA-SCNC: 27 MMOL/L — SIGNIFICANT CHANGE UP (ref 21–28)
HCT VFR BLD CALC: 29.9 % — LOW (ref 39–50)
HCT VFR BLD CALC: 29.9 % — LOW (ref 39–50)
HGB BLD-MCNC: 9.9 G/DL — LOW (ref 13–17)
HGB BLD-MCNC: 9.9 G/DL — LOW (ref 13–17)
IMM GRANULOCYTES NFR BLD AUTO: 0.2 % — SIGNIFICANT CHANGE UP (ref 0–0.9)
IMM GRANULOCYTES NFR BLD AUTO: 0.2 % — SIGNIFICANT CHANGE UP (ref 0–0.9)
INR BLD: 1.15 — SIGNIFICANT CHANGE UP (ref 0.85–1.18)
INR BLD: 1.15 — SIGNIFICANT CHANGE UP (ref 0.85–1.18)
LYMPHOCYTES # BLD AUTO: 0.75 K/UL — LOW (ref 1–3.3)
LYMPHOCYTES # BLD AUTO: 0.75 K/UL — LOW (ref 1–3.3)
LYMPHOCYTES # BLD AUTO: 16.3 % — SIGNIFICANT CHANGE UP (ref 13–44)
LYMPHOCYTES # BLD AUTO: 16.3 % — SIGNIFICANT CHANGE UP (ref 13–44)
MCHC RBC-ENTMCNC: 27 PG — SIGNIFICANT CHANGE UP (ref 27–34)
MCHC RBC-ENTMCNC: 27 PG — SIGNIFICANT CHANGE UP (ref 27–34)
MCHC RBC-ENTMCNC: 33.1 GM/DL — SIGNIFICANT CHANGE UP (ref 32–36)
MCHC RBC-ENTMCNC: 33.1 GM/DL — SIGNIFICANT CHANGE UP (ref 32–36)
MCV RBC AUTO: 81.5 FL — SIGNIFICANT CHANGE UP (ref 80–100)
MCV RBC AUTO: 81.5 FL — SIGNIFICANT CHANGE UP (ref 80–100)
MONOCYTES # BLD AUTO: 0.22 K/UL — SIGNIFICANT CHANGE UP (ref 0–0.9)
MONOCYTES # BLD AUTO: 0.22 K/UL — SIGNIFICANT CHANGE UP (ref 0–0.9)
MONOCYTES NFR BLD AUTO: 4.8 % — SIGNIFICANT CHANGE UP (ref 2–14)
MONOCYTES NFR BLD AUTO: 4.8 % — SIGNIFICANT CHANGE UP (ref 2–14)
NEUTROPHILS # BLD AUTO: 3.53 K/UL — SIGNIFICANT CHANGE UP (ref 1.8–7.4)
NEUTROPHILS # BLD AUTO: 3.53 K/UL — SIGNIFICANT CHANGE UP (ref 1.8–7.4)
NEUTROPHILS NFR BLD AUTO: 76.9 % — SIGNIFICANT CHANGE UP (ref 43–77)
NEUTROPHILS NFR BLD AUTO: 76.9 % — SIGNIFICANT CHANGE UP (ref 43–77)
NRBC # BLD: 0 /100 WBCS — SIGNIFICANT CHANGE UP (ref 0–0)
NRBC # BLD: 0 /100 WBCS — SIGNIFICANT CHANGE UP (ref 0–0)
PCO2 BLDA: 36 MMHG — SIGNIFICANT CHANGE UP (ref 35–48)
PCO2 BLDA: 36 MMHG — SIGNIFICANT CHANGE UP (ref 35–48)
PH BLDA: 7.49 — HIGH (ref 7.35–7.45)
PH BLDA: 7.49 — HIGH (ref 7.35–7.45)
PLATELET # BLD AUTO: 26 K/UL — LOW (ref 150–400)
PLATELET # BLD AUTO: 26 K/UL — LOW (ref 150–400)
PO2 BLDA: 93 MMHG — SIGNIFICANT CHANGE UP (ref 83–108)
PO2 BLDA: 93 MMHG — SIGNIFICANT CHANGE UP (ref 83–108)
POTASSIUM SERPL-MCNC: 3.5 MMOL/L — SIGNIFICANT CHANGE UP (ref 3.5–5.3)
POTASSIUM SERPL-MCNC: 3.5 MMOL/L — SIGNIFICANT CHANGE UP (ref 3.5–5.3)
POTASSIUM SERPL-SCNC: 3.5 MMOL/L — SIGNIFICANT CHANGE UP (ref 3.5–5.3)
POTASSIUM SERPL-SCNC: 3.5 MMOL/L — SIGNIFICANT CHANGE UP (ref 3.5–5.3)
PROT SERPL-MCNC: 7.5 G/DL — SIGNIFICANT CHANGE UP (ref 6–8.3)
PROT SERPL-MCNC: 7.5 G/DL — SIGNIFICANT CHANGE UP (ref 6–8.3)
PROTHROM AB SERPL-ACNC: 13.1 SEC — HIGH (ref 9.5–13)
PROTHROM AB SERPL-ACNC: 13.1 SEC — HIGH (ref 9.5–13)
RBC # BLD: 3.67 M/UL — LOW (ref 4.2–5.8)
RBC # BLD: 3.67 M/UL — LOW (ref 4.2–5.8)
RBC # FLD: 16.3 % — HIGH (ref 10.3–14.5)
RBC # FLD: 16.3 % — HIGH (ref 10.3–14.5)
SAO2 % BLDA: 98.9 % — HIGH (ref 94–98)
SAO2 % BLDA: 98.9 % — HIGH (ref 94–98)
SODIUM SERPL-SCNC: 132 MMOL/L — LOW (ref 135–145)
SODIUM SERPL-SCNC: 132 MMOL/L — LOW (ref 135–145)
SPECIMEN SOURCE: SIGNIFICANT CHANGE UP
SPECIMEN SOURCE: SIGNIFICANT CHANGE UP
WBC # BLD: 4.59 K/UL — SIGNIFICANT CHANGE UP (ref 3.8–10.5)
WBC # BLD: 4.59 K/UL — SIGNIFICANT CHANGE UP (ref 3.8–10.5)
WBC # FLD AUTO: 4.59 K/UL — SIGNIFICANT CHANGE UP (ref 3.8–10.5)
WBC # FLD AUTO: 4.59 K/UL — SIGNIFICANT CHANGE UP (ref 3.8–10.5)

## 2023-12-29 PROCEDURE — 71045 X-RAY EXAM CHEST 1 VIEW: CPT | Mod: 26

## 2023-12-29 PROCEDURE — 99254 IP/OBS CNSLTJ NEW/EST MOD 60: CPT | Mod: GC,25

## 2023-12-29 PROCEDURE — 43235 EGD DIAGNOSTIC BRUSH WASH: CPT | Mod: GC

## 2023-12-29 PROCEDURE — 99233 SBSQ HOSP IP/OBS HIGH 50: CPT | Mod: 25

## 2023-12-29 PROCEDURE — 99232 SBSQ HOSP IP/OBS MODERATE 35: CPT

## 2023-12-29 PROCEDURE — 31500 INSERT EMERGENCY AIRWAY: CPT

## 2023-12-29 RX ORDER — NOREPINEPHRINE BITARTRATE/D5W 8 MG/250ML
0.05 PLASTIC BAG, INJECTION (ML) INTRAVENOUS
Qty: 8 | Refills: 0 | Status: DISCONTINUED | OUTPATIENT
Start: 2023-12-29 | End: 2023-12-29

## 2023-12-29 RX ORDER — FENTANYL CITRATE 50 UG/ML
0.5 INJECTION INTRAVENOUS
Qty: 2500 | Refills: 0 | Status: DISCONTINUED | OUTPATIENT
Start: 2023-12-29 | End: 2023-12-29

## 2023-12-29 RX ORDER — FENTANYL CITRATE 50 UG/ML
100 INJECTION INTRAVENOUS ONCE
Refills: 0 | Status: DISCONTINUED | OUTPATIENT
Start: 2023-12-29 | End: 2023-12-29

## 2023-12-29 RX ORDER — MIDAZOLAM HYDROCHLORIDE 1 MG/ML
6 INJECTION, SOLUTION INTRAMUSCULAR; INTRAVENOUS ONCE
Refills: 0 | Status: DISCONTINUED | OUTPATIENT
Start: 2023-12-29 | End: 2023-12-29

## 2023-12-29 RX ORDER — OCTREOTIDE ACETATE 200 UG/ML
50 INJECTION, SOLUTION INTRAVENOUS; SUBCUTANEOUS
Qty: 500 | Refills: 0 | Status: DISCONTINUED | OUTPATIENT
Start: 2023-12-29 | End: 2023-12-31

## 2023-12-29 RX ORDER — ACETAMINOPHEN 500 MG
650 TABLET ORAL ONCE
Refills: 0 | Status: COMPLETED | OUTPATIENT
Start: 2023-12-29 | End: 2023-12-29

## 2023-12-29 RX ORDER — SUCCINYLCHOLINE CHLORIDE 100 MG/5ML
100 SYRINGE (ML) INTRAVENOUS ONCE
Refills: 0 | Status: COMPLETED | OUTPATIENT
Start: 2023-12-29 | End: 2023-12-29

## 2023-12-29 RX ORDER — PROPOFOL 10 MG/ML
10 INJECTION, EMULSION INTRAVENOUS
Qty: 1000 | Refills: 0 | Status: DISCONTINUED | OUTPATIENT
Start: 2023-12-29 | End: 2023-12-29

## 2023-12-29 RX ORDER — ETOMIDATE 2 MG/ML
20 INJECTION INTRAVENOUS ONCE
Refills: 0 | Status: DISCONTINUED | OUTPATIENT
Start: 2023-12-29 | End: 2023-12-29

## 2023-12-29 RX ORDER — DEXMEDETOMIDINE HYDROCHLORIDE IN 0.9% SODIUM CHLORIDE 4 UG/ML
0.1 INJECTION INTRAVENOUS
Qty: 400 | Refills: 0 | Status: DISCONTINUED | OUTPATIENT
Start: 2023-12-29 | End: 2023-12-29

## 2023-12-29 RX ORDER — FOLIC ACID 0.8 MG
1 TABLET ORAL DAILY
Refills: 0 | Status: DISCONTINUED | OUTPATIENT
Start: 2023-12-29 | End: 2023-12-31

## 2023-12-29 RX ORDER — MIDAZOLAM HYDROCHLORIDE 1 MG/ML
4 INJECTION, SOLUTION INTRAMUSCULAR; INTRAVENOUS ONCE
Refills: 0 | Status: DISCONTINUED | OUTPATIENT
Start: 2023-12-29 | End: 2023-12-29

## 2023-12-29 RX ORDER — CHLORHEXIDINE GLUCONATE 213 G/1000ML
1 SOLUTION TOPICAL
Refills: 0 | Status: DISCONTINUED | OUTPATIENT
Start: 2023-12-29 | End: 2023-12-31

## 2023-12-29 RX ADMIN — Medication 650 MILLIGRAM(S): at 06:00

## 2023-12-29 RX ADMIN — FENTANYL CITRATE 100 MICROGRAM(S): 50 INJECTION INTRAVENOUS at 09:17

## 2023-12-29 RX ADMIN — Medication 105 MILLIGRAM(S): at 13:08

## 2023-12-29 RX ADMIN — FENTANYL CITRATE 3.5 MICROGRAM(S)/KG/HR: 50 INJECTION INTRAVENOUS at 09:44

## 2023-12-29 RX ADMIN — DEXMEDETOMIDINE HYDROCHLORIDE IN 0.9% SODIUM CHLORIDE 1.75 MICROGRAM(S)/KG/HR: 4 INJECTION INTRAVENOUS at 10:55

## 2023-12-29 RX ADMIN — CEFTRIAXONE 100 MILLIGRAM(S): 500 INJECTION, POWDER, FOR SOLUTION INTRAMUSCULAR; INTRAVENOUS at 18:02

## 2023-12-29 RX ADMIN — Medication 6.56 MICROGRAM(S)/KG/MIN: at 09:44

## 2023-12-29 RX ADMIN — PANTOPRAZOLE SODIUM 40 MILLIGRAM(S): 20 TABLET, DELAYED RELEASE ORAL at 17:06

## 2023-12-29 RX ADMIN — FENTANYL CITRATE 100 MICROGRAM(S): 50 INJECTION INTRAVENOUS at 09:36

## 2023-12-29 RX ADMIN — Medication 650 MILLIGRAM(S): at 00:20

## 2023-12-29 RX ADMIN — SODIUM CHLORIDE 100 MILLILITER(S): 9 INJECTION, SOLUTION INTRAVENOUS at 04:17

## 2023-12-29 RX ADMIN — Medication 100 MILLIGRAM(S): at 09:49

## 2023-12-29 RX ADMIN — Medication 650 MILLIGRAM(S): at 23:23

## 2023-12-29 RX ADMIN — PROPOFOL 4.2 MICROGRAM(S)/KG/MIN: 10 INJECTION, EMULSION INTRAVENOUS at 09:44

## 2023-12-29 RX ADMIN — Medication 1 MILLIGRAM(S): at 23:23

## 2023-12-29 RX ADMIN — OCTREOTIDE ACETATE 10 MICROGRAM(S)/HR: 200 INJECTION, SOLUTION INTRAVENOUS; SUBCUTANEOUS at 18:15

## 2023-12-29 RX ADMIN — Medication 1 TABLET(S): at 23:23

## 2023-12-29 RX ADMIN — Medication 62.5 MILLIMOLE(S): at 04:18

## 2023-12-29 RX ADMIN — Medication 260 MILLIGRAM(S): at 05:18

## 2023-12-29 RX ADMIN — PANTOPRAZOLE SODIUM 40 MILLIGRAM(S): 20 TABLET, DELAYED RELEASE ORAL at 07:02

## 2023-12-29 RX ADMIN — MIDAZOLAM HYDROCHLORIDE 6 MILLIGRAM(S): 1 INJECTION, SOLUTION INTRAMUSCULAR; INTRAVENOUS at 08:50

## 2023-12-29 RX ADMIN — MIDAZOLAM HYDROCHLORIDE 6 MILLIGRAM(S): 1 INJECTION, SOLUTION INTRAMUSCULAR; INTRAVENOUS at 10:35

## 2023-12-29 NOTE — DIETITIAN INITIAL EVALUATION ADULT - OTHER INFO
The patient is a 43-year-old male with cirrhosis and alcohol use disorder with hx of withdrawal seizure (7 years ago) and intubation presented to [facility] for 4 episodes of hematemesis admitted for management of alcohol withdrawal. Initially pt had a CIWA >15, DUSTIN 350, , ALT 69, platelets 61. CTAP showed cirrhotic micronodular liver with portal hypertension, small diffuse varicies. Pt stepped up to ICU for CIWA >15, to start high risk CIWA protocol. GI consulted for hematemesis, started pt on octreotide drip and CTX ppx, plan for EGD on 12/29.     Discussed Pt with team during IDT rounds. Chart, meds, and labs reviewed. Met with patient on [address] [place]. Patient on pressors (norepinephrine), intubated, and sedated (dexmedetomidine, fentanyl, Propofol) and unable to participate in nutrition interview/education. Propofol 66 mL/day provides 73 kcal daily. Nutrition-focused physical exam limited by medical equipment - no fat wasting or muscle loss suspected. Tmax 37.7 C. MAPs  mm Hg. Meds significant for thiamine, pantoprazole (protonix). Labs significant for sodium 132L; glucose 144H; magnesium 1.2L; phosphorus 2.2L. Non-verbal indicators of pain are absent. Skin: no edema documented; no pressure injuries documented. GI: abdomen contour symmetrical/flat, soft/nontender; no bowel movement documented; no NG tube present. No height noted in chart - requested by nursing, pending.    The patient is a 43-year-old male with cirrhosis and alcohol use disorder with hx of withdrawal seizure (7 years ago) and intubation presented to [facility] for 4 episodes of hematemesis admitted for management of alcohol withdrawal. Initially pt had a CIWA >15, DUSTIN 350, , ALT 69, platelets 61. CTAP showed cirrhotic micronodular liver with portal hypertension, small diffuse varicies. Pt stepped up to ICU for CIWA >15, to start high risk CIWA protocol. GI consulted for hematemesis, started pt on octreotide drip and CTX ppx, plan for EGD on 12/29.     Discussed Pt with team during IDT rounds. Chart, meds, and labs reviewed. Met with patient on 8 [place]. Patient on pressors (norepinephrine), intubated, and sedated (dexmedetomidine, fentanyl, Propofol) and unable to participate in nutrition interview/education. Propofol 66 mL/day provides 73 kcal daily. Nutrition-focused physical exam limited by medical equipment - no fat wasting or muscle loss suspected. Tmax 37.7 C. MAPs  mm Hg. Meds significant for thiamine, pantoprazole (protonix). Labs significant for sodium 132L; glucose 144H; magnesium 1.2L; phosphorus 2.2L. Non-verbal indicators of pain are absent. Skin: no edema documented; no pressure injuries documented. GI: abdomen contour symmetrical/flat, soft/nontender; no bowel movement documented; no NG tube present. No height noted in chart - requested by nursing, pending.

## 2023-12-29 NOTE — PROGRESS NOTE ADULT - SUBJECTIVE AND OBJECTIVE BOX
OVERNIGHT EVENTS:    SUBJECTIVE/INTERVAL HPI: Patient was seen and examined at bedside, resting comfortably.     VITAL SIGNS:  T(F): 98.7 (12-29-23 @ 05:17)  HR: 104 (12-29-23 @ 04:00)  BP: 139/89 (12-29-23 @ 04:00)  RR: 16 (12-29-23 @ 04:00)  SpO2: 96% (12-29-23 @ 04:00)  Wt(kg): --      12-28-23 @ 07:01  -  12-29-23 @ 06:32  --------------------------------------------------------  IN: 500 mL / OUT: 150 mL / NET: 350 mL        PHYSICAL EXAM:  General: NC/AT, lying in bed, not agitated, diaphoretic    HEENT:  NC/AT, EOMI, sclera anicteric, PERRL, horizontal nystagmus, tongue fasciculations no tongue wounds, dried blood around mouth          Lymphatic system: No LN  Lungs: Bilateral BS  Cardiovascular: tachycardia, regular rhythm,  nl s1, s2, no m/r/g appreciated  Gastrointestinal: abdomen soft, NTND, bowel sounds present  Musculoskeletal: No clubbing.  Moves all extremities.    Skin: Warm, dry, intact. No rashes noted.  Neurological: A&Ox3, strength 5/5 and sensation intact in all extremities     MEDICATIONS  (STANDING):  cefTRIAXone   IVPB 1000 milliGRAM(s) IV Intermittent every 24 hours  dextrose 5% + sodium chloride 0.9%. 1000 milliLiter(s) (100 mL/Hr) IV Continuous <Continuous>  influenza   Vaccine 0.5 milliLiter(s) IntraMuscular once  octreotide  Infusion 50 MICROgram(s)/Hr (10 mL/Hr) IV Continuous <Continuous>  pantoprazole  Injectable 40 milliGRAM(s) IV Push every 12 hours  thiamine IVPB 500 milliGRAM(s) IV Intermittent every 24 hours    MEDICATIONS  (PRN):  diazepam  Injectable 10 milliGRAM(s) IV Push every 4 hours PRN Symptom-triggered when CIWA-Ar score 8 or Greater  melatonin 3 milliGRAM(s) Oral at bedtime PRN Insomnia      Allergies    No Known Allergies    Intolerances        LABS:                        9.9    4.59  )-----------( 26       ( 29 Dec 2023 04:57 )             29.9     12-29    132<L>  |  96  |  7   ----------------------------<  144<H>  3.5   |  22  |  0.44<L>    Ca    7.6<L>      29 Dec 2023 04:57  Phos  2.2     12-28  Mg     1.2     12-28    TPro  7.5  /  Alb  3.6  /  TBili  1.9<H>  /  DBili  x   /  AST  79<H>  /  ALT  37  /  AlkPhos  148<H>  12-29    PT/INR - ( 29 Dec 2023 04:57 )   PT: 13.1 sec;   INR: 1.15          PTT - ( 28 Dec 2023 18:20 )  PTT:28.9 sec  Urinalysis Basic - ( 29 Dec 2023 04:57 )    Color: x / Appearance: x / SG: x / pH: x  Gluc: 144 mg/dL / Ketone: x  / Bili: x / Urobili: x   Blood: x / Protein: x / Nitrite: x   Leuk Esterase: x / RBC: x / WBC x   Sq Epi: x / Non Sq Epi: x / Bacteria: x        RADIOLOGY & ADDITIONAL TESTS:  Reviewed OVERNIGHT EVENTS: CIWA 0 at 10pm, decreased IVF to 100cc/hr. Reported headache, given Tylenol 650mg PO x1.     SUBJECTIVE/INTERVAL HPI: Patient was seen and examined at bedside, resting comfortably. Reports diffuse body aches and diffuse abdominal pain. Denies any vomiting but reports mild nausea. Denies any SOB, chest pain, hallucinations, headaches, dizziness, or diarrhea.     VITAL SIGNS:  T(F): 98.7 (12-29-23 @ 05:17)  HR: 104 (12-29-23 @ 04:00)  BP: 139/89 (12-29-23 @ 04:00)  RR: 16 (12-29-23 @ 04:00)  SpO2: 96% (12-29-23 @ 04:00)  Wt(kg): --      12-28-23 @ 07:01  -  12-29-23 @ 06:32  --------------------------------------------------------  IN: 500 mL / OUT: 150 mL / NET: 350 mL        PHYSICAL EXAM:  General: NAD, comfortable  HEENT:  EOMI, sclera anicteric, PERRL      Lungs: CTA bilaterally   Cardiovascular: RRR, no murmurs  Gastrointestinal: soft, ND, mild diffuse tenderness, no rebound tenderness, +BS  Musculoskeletal: No LE edema. Moves all extremities.    Skin: Warm, dry, intact  Neurological: A&Ox3    MEDICATIONS  (STANDING):  cefTRIAXone   IVPB 1000 milliGRAM(s) IV Intermittent every 24 hours  dextrose 5% + sodium chloride 0.9%. 1000 milliLiter(s) (100 mL/Hr) IV Continuous <Continuous>  influenza   Vaccine 0.5 milliLiter(s) IntraMuscular once  octreotide  Infusion 50 MICROgram(s)/Hr (10 mL/Hr) IV Continuous <Continuous>  pantoprazole  Injectable 40 milliGRAM(s) IV Push every 12 hours  thiamine IVPB 500 milliGRAM(s) IV Intermittent every 24 hours    MEDICATIONS  (PRN):  diazepam  Injectable 10 milliGRAM(s) IV Push every 4 hours PRN Symptom-triggered when CIWA-Ar score 8 or Greater  melatonin 3 milliGRAM(s) Oral at bedtime PRN Insomnia      Allergies    No Known Allergies    Intolerances        LABS:                        9.9    4.59  )-----------( 26       ( 29 Dec 2023 04:57 )             29.9     12-29    132<L>  |  96  |  7   ----------------------------<  144<H>  3.5   |  22  |  0.44<L>    Ca    7.6<L>      29 Dec 2023 04:57  Phos  2.2     12-28  Mg     1.2     12-28    TPro  7.5  /  Alb  3.6  /  TBili  1.9<H>  /  DBili  x   /  AST  79<H>  /  ALT  37  /  AlkPhos  148<H>  12-29    PT/INR - ( 29 Dec 2023 04:57 )   PT: 13.1 sec;   INR: 1.15          PTT - ( 28 Dec 2023 18:20 )  PTT:28.9 sec  Urinalysis Basic - ( 29 Dec 2023 04:57 )    Color: x / Appearance: x / SG: x / pH: x  Gluc: 144 mg/dL / Ketone: x  / Bili: x / Urobili: x   Blood: x / Protein: x / Nitrite: x   Leuk Esterase: x / RBC: x / WBC x   Sq Epi: x / Non Sq Epi: x / Bacteria: x        RADIOLOGY & ADDITIONAL TESTS:  Reviewed ***Transfer from MICU to [facility]***    HOSPITAL COURSE:  The patient is a 43-year-old male with cirrhosis and alcohol use disorder with hx of withdrawal seizure (7 years ago) and intubation presented to [facility] for 4 episodes of hematemesis admitted for management of alcohol withdrawal. Initially pt had a CIWA >15, DUSTIN 350, , ALT 69, platelets 61. CTAP showed cirrhotic micronodular liver with portal hypertension, small diffuse varcies. Pt stepped up to ICU for CIWA >15, to start high risk CIWA protocol. GI consulted for hematemesis, started pt on octreotide drip and CTX ppx. On 12/29, pt was intubated for EGD, EGD without esophageal varices, showed portal HTN gastropathy and endo-clip at gastric fundus. S/p extubation and discontinued Propofol and Precedex. CIWA score of 0. Pt is medically optimized for step down to [facility].     OVERNIGHT EVENTS: CIWA 0 at 10pm, decreased IVF to 100cc/hr. Reported headache, given Tylenol 650mg PO x1.     SUBJECTIVE/INTERVAL HPI: Patient was seen and examined at bedside, resting comfortably. Reports diffuse body aches and diffuse abdominal pain. Denies any vomiting but reports mild nausea. Denies any SOB, chest pain, hallucinations, headaches, dizziness, or diarrhea.     VITAL SIGNS:  T(F): 98.7 (12-29-23 @ 05:17)  HR: 104 (12-29-23 @ 04:00)  BP: 139/89 (12-29-23 @ 04:00)  RR: 16 (12-29-23 @ 04:00)  SpO2: 96% (12-29-23 @ 04:00)  Wt(kg): --      12-28-23 @ 07:01  -  12-29-23 @ 06:32  --------------------------------------------------------  IN: 500 mL / OUT: 150 mL / NET: 350 mL        PHYSICAL EXAM:  General: NAD, comfortable  HEENT:  EOMI, sclera anicteric, PERRL      Lungs: CTA bilaterally   Cardiovascular: RRR, no murmurs  Gastrointestinal: soft, ND, mild diffuse tenderness, no rebound tenderness, +BS  Musculoskeletal: No LE edema. Moves all extremities.    Skin: Warm, dry, intact  Neurological: A&Ox3    MEDICATIONS  (STANDING):  cefTRIAXone   IVPB 1000 milliGRAM(s) IV Intermittent every 24 hours  dextrose 5% + sodium chloride 0.9%. 1000 milliLiter(s) (100 mL/Hr) IV Continuous <Continuous>  influenza   Vaccine 0.5 milliLiter(s) IntraMuscular once  octreotide  Infusion 50 MICROgram(s)/Hr (10 mL/Hr) IV Continuous <Continuous>  pantoprazole  Injectable 40 milliGRAM(s) IV Push every 12 hours  thiamine IVPB 500 milliGRAM(s) IV Intermittent every 24 hours    MEDICATIONS  (PRN):  diazepam  Injectable 10 milliGRAM(s) IV Push every 4 hours PRN Symptom-triggered when CIWA-Ar score 8 or Greater  melatonin 3 milliGRAM(s) Oral at bedtime PRN Insomnia      Allergies    No Known Allergies    Intolerances        LABS:                        9.9    4.59  )-----------( 26       ( 29 Dec 2023 04:57 )             29.9     12-29    132<L>  |  96  |  7   ----------------------------<  144<H>  3.5   |  22  |  0.44<L>    Ca    7.6<L>      29 Dec 2023 04:57  Phos  2.2     12-28  Mg     1.2     12-28    TPro  7.5  /  Alb  3.6  /  TBili  1.9<H>  /  DBili  x   /  AST  79<H>  /  ALT  37  /  AlkPhos  148<H>  12-29    PT/INR - ( 29 Dec 2023 04:57 )   PT: 13.1 sec;   INR: 1.15          PTT - ( 28 Dec 2023 18:20 )  PTT:28.9 sec  Urinalysis Basic - ( 29 Dec 2023 04:57 )    Color: x / Appearance: x / SG: x / pH: x  Gluc: 144 mg/dL / Ketone: x  / Bili: x / Urobili: x   Blood: x / Protein: x / Nitrite: x   Leuk Esterase: x / RBC: x / WBC x   Sq Epi: x / Non Sq Epi: x / Bacteria: x        RADIOLOGY & ADDITIONAL TESTS:  Reviewed ***Transfer from MICU to [facility]***    HOSPITAL COURSE:  The patient is a 43-year-old male with cirrhosis and alcohol use disorder with hx of withdrawal seizure (7 years ago) and intubation presented to [facility] for 4 episodes of hematemesis admitted for management of alcohol withdrawal. Initially pt had a CIWA >15, DUSTIN 350, , ALT 69, platelets 61. CTAP showed cirrhotic micronodular liver with portal hypertension, small diffuse varcies. Pt stepped up to ICU for CIWA >15, to start high risk CIWA protocol. GI consulted for hematemesis, started pt on octreotide drip and CTX ppx. On 12/29, pt was intubated for EGD, EGD without esophageal varices, showed portal HTN gastropathy and endo-clip at gastric fundus. S/p extubation and discontinued Propofol and Precedex. CIWA score of 0. Pt is medically optimized for step down to [facility].     OVERNIGHT EVENTS: CIWA 0 at 10pm, decreased IVF to 100cc/hr. Reported headache, given Tylenol 650mg PO x1.     SUBJECTIVE/INTERVAL HPI: Patient was seen and examined at bedside, resting comfortably. Reports diffuse body aches and diffuse abdominal pain. Denies any vomiting but reports mild nausea. Denies any SOB, chest pain, hallucinations, headaches, dizziness, or diarrhea.     VITAL SIGNS:  T(F): 98.7 (12-29-23 @ 05:17)  HR: 104 (12-29-23 @ 04:00)  BP: 139/89 (12-29-23 @ 04:00)  RR: 16 (12-29-23 @ 04:00)  SpO2: 96% (12-29-23 @ 04:00)  Wt(kg): --      12-28-23 @ 07:01  -  12-29-23 @ 06:32  --------------------------------------------------------  IN: 500 mL / OUT: 150 mL / NET: 350 mL        PHYSICAL EXAM:  General: NAD, comfortable  HEENT:  EOMI, sclera anicteric, PERRL      Lungs: CTA bilaterally   Cardiovascular: RRR, no murmurs  Gastrointestinal: soft, ND, mild diffuse tenderness, no rebound tenderness, +BS  Musculoskeletal: No LE edema. Moves all extremities.    Skin: Warm, dry, intact  Neurological: A&Ox3    MEDICATIONS  (STANDING):  cefTRIAXone   IVPB 1000 milliGRAM(s) IV Intermittent every 24 hours  dextrose 5% + sodium chloride 0.9%. 1000 milliLiter(s) (100 mL/Hr) IV Continuous <Continuous>  influenza   Vaccine 0.5 milliLiter(s) IntraMuscular once  octreotide  Infusion 50 MICROgram(s)/Hr (10 mL/Hr) IV Continuous <Continuous>  pantoprazole  Injectable 40 milliGRAM(s) IV Push every 12 hours  thiamine IVPB 500 milliGRAM(s) IV Intermittent every 24 hours    MEDICATIONS  (PRN):  diazepam  Injectable 10 milliGRAM(s) IV Push every 4 hours PRN Symptom-triggered when CIWA-Ar score 8 or Greater  melatonin 3 milliGRAM(s) Oral at bedtime PRN Insomnia      Allergies    No Known Allergies    Intolerances        LABS:                        9.9    4.59  )-----------( 26       ( 29 Dec 2023 04:57 )             29.9     12-29    132<L>  |  96  |  7   ----------------------------<  144<H>  3.5   |  22  |  0.44<L>    Ca    7.6<L>      29 Dec 2023 04:57  Phos  2.2     12-28  Mg     1.2     12-28    TPro  7.5  /  Alb  3.6  /  TBili  1.9<H>  /  DBili  x   /  AST  79<H>  /  ALT  37  /  AlkPhos  148<H>  12-29    PT/INR - ( 29 Dec 2023 04:57 )   PT: 13.1 sec;   INR: 1.15          PTT - ( 28 Dec 2023 18:20 )  PTT:28.9 sec  Urinalysis Basic - ( 29 Dec 2023 04:57 )    Color: x / Appearance: x / SG: x / pH: x  Gluc: 144 mg/dL / Ketone: x  / Bili: x / Urobili: x   Blood: x / Protein: x / Nitrite: x   Leuk Esterase: x / RBC: x / WBC x   Sq Epi: x / Non Sq Epi: x / Bacteria: x        RADIOLOGY & ADDITIONAL TESTS:  Reviewed

## 2023-12-29 NOTE — PROGRESS NOTE ADULT - PROBLEM SELECTOR PLAN 3
"Mr Roth was shocked x2 this am after 18 unsuccessful ATP.  Called to check on him and his wife reports he still feels \"sluggish and fuzzy\". He was unable to stay on the phone and hold a conversation. He was instructed to report to the ED for evaluation. He voiced agreement.  " F: S/p 1L NS bolus, c/w D5+ LR maintenance fluids   E: Replete lytes PRN K<4, Mg <2  N: clear liquid diet  Lines/Ahumada: 20G RUE and LUE, 18G LUE x1  PPx: SCDs bilateral  Code: FULL CODE  Dispo: 7U

## 2023-12-29 NOTE — PROGRESS NOTE ADULT - ASSESSMENT
The patient is a 43-year-old male with cirrhosis and alcohol use disorder with hx of withdrawal seizure (7 years ago) and intubation presented to [facility] for 4 episodes of hematemesis admitted for management of alcohol withdrawal. Initially pt had a CIWA >15, DUSTIN 350, , ALT 69, platelets 61. CTAP showed cirrhotic micronodular liver with portal hypertension, small diffuse varcies. Pt stepped up to ICU for CIWA >15, to start high risk CIWA protocol. GI consulted for hematemesis, started pt on octreotide drip and CTX ppx. On 12/29, pt was intubated for EGD, EGD without esophageal varices, showed portal HTN gastropathy and endo-clip at gastric fundus. S/p extubation and discontinued Propofol and Precedex. CIWA score of 0. Pt is medically optimized for step down to RM[facility].  The patient is a 43-year-old male with cirrhosis and alcohol use disorder with hx of withdrawal seizure (7 years ago) and intubation presented to [facility] for 4 episodes of hematemesis admitted for management of alcohol withdrawal. Initially pt had a CIWA >15, DUSTIN 350, , ALT 69, platelets 61. CTAP showed cirrhotic micronodular liver with portal hypertension, small diffuse varcies. Pt stepped up to ICU for CIWA >15, to start high risk CIWA protocol. GI consulted for hematemesis, started pt on octreotide drip and CTX ppx. On 12/29, pt was intubated for EGD, EGD without esophageal varices, showed portal HTN gastropathy and endo-clip at gastric fundus. S/p extubation and discontinued Propofol and Precedex. CIWA score of 0. Pt is medically optimized for step down to RM[facility].

## 2023-12-29 NOTE — DIETITIAN INITIAL EVALUATION ADULT - PROBLEM SELECTOR PLAN 2
Patient states that he had 4 episodes of hematemesis at home that were cup fulls.  Unclear history of varices     - 2 large bore IV  - NPO  - octreotide 50 mcg bolus bolus followed by a continuous infusion at a rate of 50 mcg per hour  - pantoprazole 40 mg IV BID  - ceftriaxone 1 g q24h for 7 days  - GI consult  - maintain active type and screen  - transfuse for hgb <7

## 2023-12-29 NOTE — DIETITIAN INITIAL EVALUATION ADULT - PROBLEM SELECTOR PLAN 1
Patient with history of alcohol withdrawal seizure 7 years ago and intubation.  On arrival to [facility] CIWA 16. High risk CIWA protocol initiated, ICU consulted.    - high risk CIWA protocol  - ativan 2 mg for CIWA >8 Patient with history of alcohol withdrawal seizure 7 years ago and intubation.  On arrival to [facility] CIWA 16. High risk CIWA protocol initiated, ICU consulted.    - high risk CIWA protocol  - ativan 2 mg for CIWA >8

## 2023-12-29 NOTE — PROGRESS NOTE ADULT - ATTENDING COMMENTS
concern for etoh abuse and hematemsis.  Has not required PRN in MICU for etoh withdrawal  intubated and underwent EGD, no active bleeding   extubated  Decision making high complexity

## 2023-12-29 NOTE — DIETITIAN INITIAL EVALUATION ADULT - PERTINENT MEDS FT
MEDICATIONS  (STANDING):  cefTRIAXone   IVPB 1000 milliGRAM(s) IV Intermittent every 24 hours  chlorhexidine 2% Cloths 1 Application(s) Topical <User Schedule>  dexMEDEtomidine Infusion 0.1 MICROgram(s)/kG/Hr (1.75 mL/Hr) IV Continuous <Continuous>  fentaNYL   Infusion. 0.5 MICROgram(s)/kG/Hr (3.5 mL/Hr) IV Continuous <Continuous>  influenza   Vaccine 0.5 milliLiter(s) IntraMuscular once  norepinephrine Infusion 0.05 MICROgram(s)/kG/Min (6.56 mL/Hr) IV Continuous <Continuous>  octreotide  Infusion 50 MICROgram(s)/Hr (10 mL/Hr) IV Continuous <Continuous>  pantoprazole  Injectable 40 milliGRAM(s) IV Push every 12 hours  propofol Infusion 10 MICROgram(s)/kG/Min (4.2 mL/Hr) IV Continuous <Continuous>  thiamine IVPB 500 milliGRAM(s) IV Intermittent every 24 hours    MEDICATIONS  (PRN):  diazepam  Injectable 10 milliGRAM(s) IV Push every 4 hours PRN Symptom-triggered when CIWA-Ar score 8 or Greater  melatonin 3 milliGRAM(s) Oral at bedtime PRN Insomnia

## 2023-12-29 NOTE — PROGRESS NOTE ADULT - ATTENDING COMMENTS
42 yo Montenegrin-speaking M with PMHx cirrhosis, etOH use disorder (hx of withdrawal with seizures, intubations) [place] admitted to MICU for further management of etOH withdrawal and monitoring for UGIB requiring intubation for EGD 12/29 now extubated and stable for stepdown to F for ongoing management.     #Hematemesis – BIBEMS following witnessed hematemesis episodes. Over course of admission, Hb drop from 13.5 on 12/28 to Hb 9.9 on 12/29 AM. Partially could be explained by dilutional effect. CTAP 12/28 showing no acute pathology, cirrhotic liver, small varices. GI consulted. Pt intubated for EGD, extubated s/p procedure and weaned to room air. EGD 12/29 showing small esophageal varices without active bleeding, portal HTN gastropathy without gastric varices, no acute/active bleeding identified. Continue octreotide gtt to completion. Daily CBC/MELD labs. Advance diet to clear liquids as tolerated. Continue PPI. Aspiration precautions.      #etOH withdrawal – Last drink 12/27-12/28.  on admission to MICU. Continue high-risk CIWA protocol. Ativan PRN for CIWA >8. Continue FA, MV, thiamine.      #Decompensated cirrhosis – CTAP with evidence of known cirrhosis 2/2 etOH use. Daily MELD labs. Management of hematemesis/varices as above.      Agree with remainder of resident plan as above. 44 yo Montserratian-speaking M with PMHx cirrhosis, etOH use disorder (hx of withdrawal with seizures, intubations) [place]emesis admitted to MICU for further management of etOH withdrawal and monitoring for UGIB requiring intubation for EGD 12/29 now extubated and stable for stepdown to F for ongoing management.     #Hematemesis – BIBEMS following witnessed hematemesis episodes. Over course of admission, Hb drop from 13.5 on 12/28 to Hb 9.9 on 12/29 AM. Partially could be explained by dilutional effect. CTAP 12/28 showing no acute pathology, cirrhotic liver, small varices. GI consulted. Pt intubated for EGD, extubated s/p procedure and weaned to room air. EGD 12/29 showing small esophageal varices without active bleeding, portal HTN gastropathy without gastric varices, no acute/active bleeding identified. Continue octreotide gtt to completion. Daily CBC/MELD labs. Advance diet to clear liquids as tolerated. Continue PPI. Aspiration precautions.      #etOH withdrawal – Last drink 12/27-12/28.  on admission to MICU. Continue high-risk CIWA protocol. Ativan PRN for CIWA >8. Continue FA, MV, thiamine.      #Decompensated cirrhosis – CTAP with evidence of known cirrhosis 2/2 etOH use. Daily MELD labs. Management of hematemesis/varices as above.      Agree with remainder of resident plan as above.

## 2023-12-29 NOTE — PROGRESS NOTE ADULT - ASSESSMENT
44 YO M who denies PMH other than ETOH abuse disorder with a hx of withdrawal sz x1 presented to [facility] due to 4 episodes of hematemesis at home, stepped up to MICU for management for ETOH withdrawal and c/f UGIB    NEURO:   #ETOH withdrawal - patient with history of seizure disorder, states drinks about 1 bottle of vodka per week with last drink being 12/26. DUSTIN 350. Had CIWA >15; with significant improvement w/ Valium and ativan.   Plan:  - c/w high risk CIWA protocol   - c/w valium 10mg q4 hours CIWA >8, ativan 2mg q1 hr CIWA >8   - c/w thiamine, MV, folate supplementation     PULM:   - Breathing well on RA, no tachypnea  - NPO, aspiration precautions     CARDIO  #Hemodynamics  #Sinus tachycardia - likely multifactorial i/s/o ETOH withdrawal and c/f UGIB. BP currently stable. Does not need pressors support at this time.   - continue to monitor BP and tachycardia     GI:   #Decompensated Cirrhosis 2/2 ETOH abuse - patient with cirrhosis seen on CT w/ varices. INR wnl. C/w esophageal varices vs. [place] tear. Hb drop from 13.5-->10, repeat CBC 10. Plt dropped from 61 to 30.   Plan:  - GI consulted - will do EGD on 12/29 AM, keep patient NPO   - CTX 1g x 7 days for PPX  - Octreotide gtt  - PPI 40mg BID IVP  - Aspiration precautions  - F/u hepatitis panel     :  - Urine noted in bladder during POCUS  - monitor UOP assure adequate >0.5cc/kg per her     RENAL:  - patient with normal BUN/Cr. Initial w/ elevated AG i/s/o elevated lactate which has downtrended after IVF   - c/w D5 + NS as patient will be NPO and will need dextrose i/s/o heavy alcohol use and poor PO intake to avoid significant ketosis     MSK:   No active issues     ENDOCRINE:  - monitor finger sticks     ID:  - Ceftriaxone for prophylaxis     FEN:   F: 1L NS bolus, c/w D5+ LR maintenance fluids   E: Replete lytes PRN K<4, Mg <2  N: NPO  Lines/Ahumada: 20G RUE, 18G LUE x1  PPx: SCDs  Code: FULL CODE  Dispo: MICU  42 YO M who denies PMH other than ETOH abuse disorder with a hx of withdrawal sz x1 presented to [facility] due to 4 episodes of hematemesis at home, stepped up to MICU for management for ETOH withdrawal and c/f UGIB    NEURO:   #ETOH withdrawal - patient with history of seizure disorder, states drinks about 1 bottle of vodka per week with last drink being 12/26. DUSTIN 350. Had CIWA >15; with significant improvement w/ Valium and ativan.   Plan:  - c/w high risk CIWA protocol   - c/w valium 10mg q4 hours CIWA >8, ativan 2mg q1 hr CIWA >8   - c/w thiamine, MV, folate supplementation     PULM:   - Breathing well on RA, no tachypnea  - NPO, aspiration precautions     CARDIO  #Hemodynamics  #Sinus tachycardia - likely multifactorial i/s/o ETOH withdrawal and c/f UGIB. BP currently stable. Does not need pressors support at this time.   - continue to monitor BP and tachycardia     GI:   #Decompensated Cirrhosis 2/2 ETOH abuse - patient with cirrhosis seen on CT w/ varices. INR wnl. C/w esophageal varices vs. [place] tear. Hb drop from 13.5-->10, repeat CBC 10. Plt dropped from 61 to 30.   Plan:  - GI consulted - will do EGD on 12/29 AM, keep patient NPO   - CTX 1g x 7 days for PPX  - Octreotide gtt  - PPI 40mg BID IVP  - Aspiration precautions  - F/u hepatitis panel     :  - Urine noted in bladder during POCUS  - monitor UOP assure adequate >0.5cc/kg per her     RENAL:  - patient with normal BUN/Cr. Initial w/ elevated AG i/s/o elevated lactate which has downtrended after IVF   - c/w D5 + NS as patient will be NPO and will need dextrose i/s/o heavy alcohol use and poor PO intake to avoid significant ketosis     MSK:   No active issues     ENDOCRINE:  - monitor finger sticks     ID:  - Ceftriaxone for prophylaxis     FEN:   F: 1L NS bolus, c/w D5+ LR maintenance fluids   E: Replete lytes PRN K<4, Mg <2  N: NPO  Lines/Ahumada: 20G RUE, 18G LUE x1  PPx: SCDs  Code: FULL CODE  Dispo: MICU The patient is a 43-year-old male with cirrhosis and alcohol use disorder with hx of withdrawal seizure (7 years ago) and intubation presented to [facility] for 4 episodes of hematemesis admitted for management of alcohol withdrawal. Initially pt had a CIWA >15, DUSTIN 350, , ALT 69, platelets 61. CTAP showed cirrhotic micronodular liver with portal hypertension, small diffuse varcies. Pt stepped up to ICU for CIWA >15, to start high risk CIWA protocol. GI consulted for hematemesis, started pt on octreotide drip and CTX ppx. On 12/29, pt was intubated for EGD, EGD without esophageal varices, showed portal HTN gastropathy and endo-clip at gastric fundus. S/p extubation and discontinued Propofol and Precedex. CIWA score of 0. Pt is medically optimized for step down to [facility].     NEURO:   #ETOH withdrawal.  Pt with alcohol use disorder (withdrawal seizure 7 years ago, intubated in the past due to w/d) and cirrhosis. Last drink on 12/26. DUSTIN 350. On2/28, CIWA >15; with significant improvement w/ Valium and ativan. On 12/29, CIWA of 0.   - c/w CIWA protocol   - c/w ativan 2mg IV q4h PRN CIWA>8  - c/w thiamine 500mg IV qd  - start folate and mulitvitamin once diet is advanced to PO    PULM:   MEET  Satting well on RA    CARDIO  #Sinus tachycardia, improving.   Presented on admission with sinus tachycardia, multifactorial etiology i/s/o ETOH withdrawal and UGIB. BP currently stable.   - Continue to monitor    GI:   Advanced to clear liquid diet on 12/29     #Decompensated Cirrhosis 2/2 ETOH use disorder.  Patient with cirrhosis presented with 4 episodes of hematemesis, CTAP showing cirrhosis w/ small diffuse esophageal varices. INR wnl. Hb drop from 13.5-->10 and Plt dropped from 61 to 30 on 12/28. GI consulted for hematemesis, started pt on octreotide drip and CTX ppx. On 12/29,  EGD without esophageal varices, showed portal HTN gastropathy and endo-clip at gastric fundus. 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   - CTX 1g IV qd x7 days for PPX (12/28- )  - Octreotide gtt x3 days (12/28- )  - C/w Protonix 40mg IV BID  - Continue to monitor daily MELD   - Aspiration precautions  - F/u hepatitis panel     :  MEET    RENAL:  MEET    ENDOCRINE:  MEET    ID:  - Ceftriaxone for prophylaxis     FEN:   F: S/p 1L NS bolus, c/w D5+ LR maintenance fluids   E: Replete lytes PRN K<4, Mg <2  N: clear liquid diet  Lines/Ahumada: 20G RUE and LUE, 18G LUE x1  PPx: SCDs bilateral  Code: FULL CODE  Dispo: [facility]   The patient is a 43-year-old male with cirrhosis and alcohol use disorder with hx of withdrawal seizure (7 years ago) and intubation presented to [facility] for 4 episodes of hematemesis admitted for management of alcohol withdrawal. Initially pt had a CIWA >15, DUSTIN 350, , ALT 69, platelets 61. CTAP showed cirrhotic micronodular liver with portal hypertension, small diffuse varcies. Pt stepped up to ICU for CIWA >15, to start high risk CIWA protocol. GI consulted for hematemesis, started pt on octreotide drip and CTX ppx. On 12/29, pt was intubated for EGD, EGD without esophageal varices, showed portal HTN gastropathy and endo-clip at gastric fundus. S/p extubation and discontinued Propofol and Precedex. CIWA score of 0. Pt is medically optimized for step down to [facility].     NEURO:   #ETOH withdrawal.  Pt with alcohol use disorder (withdrawal seizure 7 years ago, intubated in the past due to w/d) and cirrhosis. Last drink on 12/26. DUSTIN 350. On2/28, CIWA >15; with significant improvement w/ Valium and ativan. On 12/29, CIWA of 0.   - c/w CIWA protocol   - c/w ativan 2mg IV q4h PRN CIWA>8  - c/w thiamine 500mg IV qd  - start folate and mulitvitamin once diet is advanced to PO    PULM:   MEET  Satting well on RA    CARDIO  #Sinus tachycardia, improving.   Presented on admission with sinus tachycardia, multifactorial etiology i/s/o ETOH withdrawal and UGIB. BP currently stable.   - Continue to monitor    GI:   Advanced to clear liquid diet on 12/29     #Decompensated Cirrhosis 2/2 ETOH use disorder.  Patient with cirrhosis presented with 4 episodes of hematemesis, CTAP showing cirrhosis w/ small diffuse esophageal varices. INR wnl. Hb drop from 13.5-->10 and Plt dropped from 61 to 30 on 12/28. GI consulted for hematemesis, started pt on octreotide drip and CTX ppx. On 12/29,  EGD without esophageal varices, showed portal HTN gastropathy and endo-clip at gastric fundus. 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   - CTX 1g IV qd x7 days for PPX (12/28- )  - Octreotide gtt x3 days (12/28- )  - C/w Protonix 40mg IV BID  - Continue to monitor daily MELD   - Aspiration precautions  - F/u hepatitis panel     :  MEET    RENAL:  MEET    ENDOCRINE:  MEET    ID:  - Ceftriaxone for prophylaxis     FEN:   F: S/p 1L NS bolus, c/w D5+ LR maintenance fluids   E: Replete lytes PRN K<4, Mg <2  N: clear liquid diet  Lines/Ahumada: 20G RUE and LUE, 18G LUE x1  PPx: SCDs bilateral  Code: FULL CODE  Dispo: [facility]

## 2023-12-29 NOTE — DIETITIAN INITIAL EVALUATION ADULT - OTHER CALCULATIONS
HT (inches): unknown       WT (pounds): 154.3 (12/29)       BMI (kg/m^2): unknown     IBW (pounds): unknown   Current body weight used to calculate energy. Fluids at team's discretion.

## 2023-12-29 NOTE — DIETITIAN INITIAL EVALUATION ADULT - ADD RECOMMEND
- When medically feasible, please advance to a REGULAR diet with consistency per team/S+S  - Recommend 1 mg folic acid 1x/day for 3 days; multivitamin 1x/day unless contraindicated   - Please add Ensure Plus High Protein (350 kcals, 20 g prot each, 20 g sugar) BID to help meet protein/energy needs  - If PO diet is not feasible or indicated, recommend consult nutrition team/dietitians for alternate means of nutrition (EN or PN) recommendations  - Monitor PO intake/appetite, diet tolerance, GI distress, labs (electrolytes, CMP)  - Pt with history of hypophosphatemia and hypomagnesemia; please continue to monitor and replete as needed   - Requesting to trend weights (weekly) as able  - Honor Pt food preferences as able  - Pain and bowel regimen as per team  - Team notified of the above recommendations

## 2023-12-29 NOTE — DIETITIAN INITIAL EVALUATION ADULT - LAB (SPECIFY)
- Pt with history of hypophosphatemia and hypomagnesemia; please continue to monitor and replete as needed

## 2023-12-29 NOTE — CONSULT NOTE ADULT - ASSESSMENT
Patient is a 43M with alcohol use disorder and cirrhosis presenting to [facility] overnight with etoh intoxication and complaining of hematemesis. MELD 12. Found to have plt 30, varices on CT. Presented in alcohol withdrawal. Now s/p EGD with no obvious bleeding.    EGD 12/29:  - small esophageal varices  - portal hypertensive gastropathy  - gastric polyp  - cardiac clip  - normal duodenum  - no blood seen on exam    Recommendations:  - Monitor hgb and daily meld labs  - PPI  - Complete octreotide drip  - Ceftriaxone 1g x 7 days for SBP PPX  - Thiamine, MV, folate  - Advice diet to clear liquids today; if stable ok to advance to regular (high protein) diet  - Treatment of alcohol withdrawal per primary team  -  referral for resources for alcohol cessation    Ai Villegas MD  Gastroenterology Fellow, PGY -6  Weekday Pager [phone] Patient is a 43M with alcohol use disorder and cirrhosis presenting to [facility] overnight with etoh intoxication and complaining of hematemesis. MELD 12. Found to have plt 30, varices on CT. Presented in alcohol withdrawal. Now s/p EGD with no obvious bleeding.    EGD 12/29:  - small esophageal varices  - portal hypertensive gastropathy  - gastric polyp  - cardiac clip  - normal duodenum  - no blood seen on exam    Recommendations:  - Monitor hgb and daily meld labs  - PPI  - Complete octreotide drip  - Ceftriaxone 1g x 7 days for SBP PPX  - Thiamine, MV, folate  - Advice diet to clear liquids today; if stable ok to advance to regular (high protein) diet  - Treatment of alcohol withdrawal per primary team  -  referral for resources for alcohol cessation    Ai Villegas MD  Gastroenterology Fellow, PGY -6  Weekday Pager [phone] Patient is a 43M with alcohol use disorder and cirrhosis presenting to [facility] overnight with etoh intoxication and complaining of hematemesis. MELD 12. Found to have plt 30, varices on CT. Presented in alcohol withdrawal. Now s/p EGD with no obvious bleeding. Unclear etiology- PHG v gerardo altamirano tear. less likely variceal bleed.    EGD 12/29:  - small esophageal varices w/o stigmata of bleed  - portal hypertensive gastropathy. no gastric varices  - gastric polyp  - cardiac clip  - normal duodenum  - no blood seen on exam    Recommendations:  - Monitor hgb and daily meld labs  - PPI  - Complete octreotide drip  - Ceftriaxone 1g x 7 days for SBP PPX  - Thiamine, MV, folate  - Advice diet to clear liquids today; if stable ok to advance to regular (high protein) diet  - Treatment of alcohol withdrawal per primary team  -  referral for resources for alcohol cessation    Ai Villegas MD  Gastroenterology Fellow, PGY -6  Weekday Pager [phone] Patient is a 43M with alcohol use disorder and cirrhosis presenting to [facility] overnight with etoh intoxication and complaining of hematemesis. MELD 12. Found to have plt 30, varices on CT. Presented in alcohol withdrawal. Now s/p EGD with no obvious bleeding. Unclear etiology- PHG v gerardo altamirano tear. less likely variceal bleed.    EGD 12/29:  - small esophageal varices w/o stigmata of bleed  - portal hypertensive gastropathy. no gastric varices  - gastric polyp  - cardiac clip  - normal duodenum  - no blood seen on exam    Recommendations:  - Monitor hgb and daily meld labs  - PPI  - Complete octreotide drip  - Ceftriaxone 1g x 7 days for SBP PPX  - Thiamine, MV, folate  - Advice diet to clear liquids today; if stable ok to advance to regular (high protein) diet  - Treatment of alcohol withdrawal per primary team  -  referral for resources for alcohol cessation    Ai Villegas MD  Gastroenterology Fellow, PGY -6  Weekday Pager [phone]

## 2023-12-29 NOTE — CONSULT NOTE ADULT - SUBJECTIVE AND OBJECTIVE BOX
GASTROENTEROLOGY CONSULT NOTE  HPI: The patient is a 44 YO M with alcohol use disorder who presented to [facility] complaining of 4 episodes of hematemesis, filling 2 cups with bright red blood.  He states that when he drinks he usually finishes a bottle of vodka most weekend days. He states that his last drink was 2 days ago. He endorses a history of alcohol withdrawal symptoms including visual and auditory hallucinations, tremors, and nausea. He states that he had one seizure 7 years ago after drinking but was not evaluated because he felt that he recovered after. Also endorses that he was intubated one time for alcohol use. States that when he drinks he doesn't eat when he drinks, last meal was 2 days ago.    At [facility] he had a CT that showed cirrhotic .micronodular liver with portal hypertension: diffuse small varices. He had no witnessed bleeding in the ED.    He was transferred to [facility] and started on octreotide, ceftriaxone and PPI. Hgb stable x3. Noted to be withdrawing iwth tachycardia, htn, and hallucinations. Treated for alcohol withdrawal.      Medications: chlordiazePOXIDE 50 milliGRAM(s) Oral Once  folic acid Injectable 1 milliGRAM(s) IV Push Once  ondansetron Injectable 4 milliGRAM(s) IV Push once  pantoprazole  Injectable 40 milliGRAM(s) IV Push Once  pantoprazole  Injectable 40 milliGRAM(s) IV Push Once  sodium chloride 0.9% Bolus 1000 milliLiter(s) IV Bolus once  sodium chloride 0.9% Bolus 1000 milliLiter(s) IV Bolus once  thiamine Injectable 100 milliGRAM(s) IV Push Once    Consults: none     (28 Dec 2023 16:31)    Allergies    No Known Allergies    Intolerances      Home Medications:    MEDICATIONS:  MEDICATIONS  (STANDING):  cefTRIAXone   IVPB 1000 milliGRAM(s) IV Intermittent every 24 hours  chlorhexidine 2% Cloths 1 Application(s) Topical <User Schedule>  dexMEDEtomidine Infusion 0.1 MICROgram(s)/kG/Hr (1.75 mL/Hr) IV Continuous <Continuous>  fentaNYL   Infusion. 0.5 MICROgram(s)/kG/Hr (3.5 mL/Hr) IV Continuous <Continuous>  influenza   Vaccine 0.5 milliLiter(s) IntraMuscular once  norepinephrine Infusion 0.05 MICROgram(s)/kG/Min (6.56 mL/Hr) IV Continuous <Continuous>  octreotide  Infusion 50 MICROgram(s)/Hr (10 mL/Hr) IV Continuous <Continuous>  pantoprazole  Injectable 40 milliGRAM(s) IV Push every 12 hours  propofol Infusion 10 MICROgram(s)/kG/Min (4.2 mL/Hr) IV Continuous <Continuous>  thiamine IVPB 500 milliGRAM(s) IV Intermittent every 24 hours    MEDICATIONS  (PRN):  diazepam  Injectable 10 milliGRAM(s) IV Push every 4 hours PRN Symptom-triggered when CIWA-Ar score 8 or Greater  melatonin 3 milliGRAM(s) Oral at bedtime PRN Insomnia    PAST MEDICAL & SURGICAL HISTORY:  History of seizure due to alcohol withdrawal      No significant past surgical history        FAMILY HISTORY:    SOCIAL HISTORY:  Tobacco: [ ] Current, [ ] Former, [ ] Never; Pack Years:  Alcohol:  Illicit Drugs:    REVIEW OF SYSTEMS:  CONSTITUTIONAL: No weakness, fevers or chills  HEENT: No visual changes; No vertigo or throat pain   NECK: No pain or stiffness  RESPIRATORY: No cough, wheezing, hemoptysis; No shortness of breath  CARDIOVASCULAR: No chest pain or palpitations  GASTROINTESTINAL: As above.  GENITOURINARY: No dysuria, frequency or hematuria  NEUROLOGICAL: No numbness or weakness  SKIN: No itching, burning, rashes, or lesions   All other 10 review of systems is negative unless indicated above.    Vital Signs Last 24 Hrs  T(C): 37.1 (29 Dec 2023 05:17), Max: 37.7 (28 Dec 2023 21:00)  T(F): 98.7 (29 Dec 2023 05:17), Max: 99.9 (28 Dec 2023 21:00)  HR: 92 (29 Dec 2023 13:00) (82 - 126)  BP: 89/56 (29 Dec 2023 13:00) (89/56 - 169/89)  BP(mean): 67 (29 Dec 2023 13:00) (67 - 117)  RR: 19 (29 Dec 2023 13:00) (14 - 30)  SpO2: 96% (29 Dec 2023 13:00) (94% - 98%)    Parameters below as of 29 Dec 2023 13:00  Patient On (Oxygen Delivery Method): ventilator  O2 Flow (L/min): 40      12-28 @ 07:01  -  12-29 @ 07:00  --------------------------------------------------------  IN: 1550 mL / OUT: 500 mL / NET: 1050 mL    12-29 @ 07:01  -  12-29 @ 13:36  --------------------------------------------------------  IN: 426 mL / OUT: 700 mL / NET: -274 mL        PHYSICAL EXAM:    General: in no acute distress  Eyes: Anicteric sclerae, moist conjunctivae  HENT: Moist mucous membranes  Neck: Trachea midline, supple  Lungs: Normal respiratory effort, no intercostal retractions  Cardiovascular: RRR  Abdomen: Soft, non-tender non-distended; No rebound or guarding  Extremities: Normal range of motion, No clubbing, cyanosis or edema  Neurological: Alert and oriented x3  Skin: Warm and dry. No obvious rash    LABS:                        9.9    4.59  )-----------( 26       ( 29 Dec 2023 04:57 )             29.9     12-29    132<L>  |  96  |  7   ----------------------------<  144<H>  3.5   |  22  |  0.44<L>    Ca    7.6<L>      29 Dec 2023 04:57  Phos  2.2     12-28  Mg     1.2     12-28    TPro  7.5  /  Alb  3.6  /  TBili  1.9<H>  /  DBili  x   /  AST  79<H>  /  ALT  37  /  AlkPhos  148<H>  12-29        PT/INR - ( 29 Dec 2023 04:57 )   PT: 13.1 sec;   INR: 1.15          PTT - ( 28 Dec 2023 18:20 )  PTT:28.9 sec    RADIOLOGY & ADDITIONAL STUDIES:     Reviewed GASTROENTEROLOGY CONSULT NOTE  HPI: The patient is a 44 YO M with alcohol use disorder who presented to [facility] complaining of 4 episodes of hematemesis, filling 2 cups with bright red blood.  He states that when he drinks he usually finishes a bottle of vodka most weekend days. He states that his last drink was 2 days ago. He endorses a history of alcohol withdrawal symptoms including visual and auditory hallucinations, tremors, and nausea. He states that he had one seizure 7 years ago after drinking but was not evaluated because he felt that he recovered after. Also endorses that he was intubated one time for alcohol use. States that when he drinks he doesn't eat when he drinks, last meal was 2 days ago.    At [facility] he had a CT that showed cirrhotic .micronodular liver with portal hypertension: diffuse small varices. He had no witnessed bleeding in the ED.    He was transferred to [facility] and started on octreotide, ceftriaxone and PPI. Hgb stable x3. Noted to be withdrawing iwth tachycardia, htn, and hallucinations. Treated for alcohol withdrawal.      Medications: chlordiazePOXIDE 50 milliGRAM(s) Oral Once  folic acid Injectable 1 milliGRAM(s) IV Push Once  ondansetron Injectable 4 milliGRAM(s) IV Push once  pantoprazole  Injectable 40 milliGRAM(s) IV Push Once  pantoprazole  Injectable 40 milliGRAM(s) IV Push Once  sodium chloride 0.9% Bolus 1000 milliLiter(s) IV Bolus once  sodium chloride 0.9% Bolus 1000 milliLiter(s) IV Bolus once  thiamine Injectable 100 milliGRAM(s) IV Push Once    Consults: none     (28 Dec 2023 16:31)    Allergies    No Known Allergies    Intolerances      Home Medications:    MEDICATIONS:  MEDICATIONS  (STANDING):  cefTRIAXone   IVPB 1000 milliGRAM(s) IV Intermittent every 24 hours  chlorhexidine 2% Cloths 1 Application(s) Topical <User Schedule>  dexMEDEtomidine Infusion 0.1 MICROgram(s)/kG/Hr (1.75 mL/Hr) IV Continuous <Continuous>  fentaNYL   Infusion. 0.5 MICROgram(s)/kG/Hr (3.5 mL/Hr) IV Continuous <Continuous>  influenza   Vaccine 0.5 milliLiter(s) IntraMuscular once  norepinephrine Infusion 0.05 MICROgram(s)/kG/Min (6.56 mL/Hr) IV Continuous <Continuous>  octreotide  Infusion 50 MICROgram(s)/Hr (10 mL/Hr) IV Continuous <Continuous>  pantoprazole  Injectable 40 milliGRAM(s) IV Push every 12 hours  propofol Infusion 10 MICROgram(s)/kG/Min (4.2 mL/Hr) IV Continuous <Continuous>  thiamine IVPB 500 milliGRAM(s) IV Intermittent every 24 hours    MEDICATIONS  (PRN):  diazepam  Injectable 10 milliGRAM(s) IV Push every 4 hours PRN Symptom-triggered when CIWA-Ar score 8 or Greater  melatonin 3 milliGRAM(s) Oral at bedtime PRN Insomnia    PAST MEDICAL & SURGICAL HISTORY:  History of seizure due to alcohol withdrawal      No significant past surgical history        FAMILY HISTORY:    SOCIAL HISTORY:  Tobacco: [ ] Current, [ ] Former, [ ] Never; Pack Years:  Alcohol:  Illicit Drugs:    REVIEW OF SYSTEMS:  CONSTITUTIONAL: No weakness, fevers or chills  HEENT: No visual changes; No vertigo or throat pain   NECK: No pain or stiffness  RESPIRATORY: No cough, wheezing, hemoptysis; No shortness of breath  CARDIOVASCULAR: No chest pain or palpitations  GASTROINTESTINAL: As above.  GENITOURINARY: No dysuria, frequency or hematuria  NEUROLOGICAL: No numbness or weakness  SKIN: No itching, burning, rashes, or lesions   All other 10 review of systems is negative unless indicated above.    Vital Signs Last 24 Hrs  T(C): 37.1 (29 Dec 2023 05:17), Max: 37.7 (28 Dec 2023 21:00)  T(F): 98.7 (29 Dec 2023 05:17), Max: 99.9 (28 Dec 2023 21:00)  HR: 92 (29 Dec 2023 13:00) (82 - 126)  BP: 89/56 (29 Dec 2023 13:00) (89/56 - 169/89)  BP(mean): 67 (29 Dec 2023 13:00) (67 - 117)  RR: 19 (29 Dec 2023 13:00) (14 - 30)  SpO2: 96% (29 Dec 2023 13:00) (94% - 98%)    Parameters below as of 29 Dec 2023 13:00  Patient On (Oxygen Delivery Method): ventilator  O2 Flow (L/min): 40      12-28 @ 07:01  -  12-29 @ 07:00  --------------------------------------------------------  IN: 1550 mL / OUT: 500 mL / NET: 1050 mL    12-29 @ 07:01  -  12-29 @ 13:36  --------------------------------------------------------  IN: 426 mL / OUT: 700 mL / NET: -274 mL        PHYSICAL EXAM:    General: in no acute distress  Eyes: Anicteric sclerae, moist conjunctivae  HENT: Moist mucous membranes  Neck: Trachea midline, supple  Lungs: Normal respiratory effort, no intercostal retractions  Cardiovascular: RRR  Abdomen: Soft, non-tender non-distended; No rebound or guarding  Extremities: Normal range of motion, No clubbing, cyanosis or edema  Neurological: Alert and oriented x3  Skin: Warm and dry. No obvious rash    LABS:                        9.9    4.59  )-----------( 26       ( 29 Dec 2023 04:57 )             29.9     12-29    132<L>  |  96  |  7   ----------------------------<  144<H>  3.5   |  22  |  0.44<L>    Ca    7.6<L>      29 Dec 2023 04:57  Phos  2.2     12-28  Mg     1.2     12-28    TPro  7.5  /  Alb  3.6  /  TBili  1.9<H>  /  DBili  x   /  AST  79<H>  /  ALT  37  /  AlkPhos  148<H>  12-29        PT/INR - ( 29 Dec 2023 04:57 )   PT: 13.1 sec;   INR: 1.15          PTT - ( 28 Dec 2023 18:20 )  PTT:28.9 sec    RADIOLOGY & ADDITIONAL STUDIES:     Reviewed

## 2023-12-29 NOTE — DIETITIAN INITIAL EVALUATION ADULT - PERTINENT LABORATORY DATA
12-29    132<L>  |  96  |  7   ----------------------------<  144<H>  3.5   |  22  |  0.44<L>    Ca    7.6<L>      29 Dec 2023 04:57  Phos  2.2     12-28  Mg     1.2     12-28    TPro  7.5  /  Alb  3.6  /  TBili  1.9<H>  /  DBili  x   /  AST  79<H>  /  ALT  37  /  AlkPhos  148<H>  12-29

## 2023-12-30 LAB
ALBUMIN SERPL ELPH-MCNC: 3.4 G/DL — SIGNIFICANT CHANGE UP (ref 3.3–5)
ALBUMIN SERPL ELPH-MCNC: 3.4 G/DL — SIGNIFICANT CHANGE UP (ref 3.3–5)
ALP SERPL-CCNC: 149 U/L — HIGH (ref 40–120)
ALP SERPL-CCNC: 149 U/L — HIGH (ref 40–120)
ALT FLD-CCNC: 40 U/L — SIGNIFICANT CHANGE UP (ref 10–45)
ALT FLD-CCNC: 40 U/L — SIGNIFICANT CHANGE UP (ref 10–45)
ANION GAP SERPL CALC-SCNC: 11 MMOL/L — SIGNIFICANT CHANGE UP (ref 5–17)
ANION GAP SERPL CALC-SCNC: 11 MMOL/L — SIGNIFICANT CHANGE UP (ref 5–17)
ANION GAP SERPL CALC-SCNC: 12 MMOL/L — SIGNIFICANT CHANGE UP (ref 5–17)
ANION GAP SERPL CALC-SCNC: 12 MMOL/L — SIGNIFICANT CHANGE UP (ref 5–17)
ANION GAP SERPL CALC-SCNC: 9 MMOL/L — SIGNIFICANT CHANGE UP (ref 5–17)
ANION GAP SERPL CALC-SCNC: 9 MMOL/L — SIGNIFICANT CHANGE UP (ref 5–17)
APTT BLD: 28.4 SEC — SIGNIFICANT CHANGE UP (ref 24.5–35.6)
APTT BLD: 28.4 SEC — SIGNIFICANT CHANGE UP (ref 24.5–35.6)
AST SERPL-CCNC: 87 U/L — HIGH (ref 10–40)
AST SERPL-CCNC: 87 U/L — HIGH (ref 10–40)
BASOPHILS # BLD AUTO: 0.05 K/UL — SIGNIFICANT CHANGE UP (ref 0–0.2)
BASOPHILS # BLD AUTO: 0.05 K/UL — SIGNIFICANT CHANGE UP (ref 0–0.2)
BASOPHILS NFR BLD AUTO: 1 % — SIGNIFICANT CHANGE UP (ref 0–2)
BASOPHILS NFR BLD AUTO: 1 % — SIGNIFICANT CHANGE UP (ref 0–2)
BILIRUB SERPL-MCNC: 2 MG/DL — HIGH (ref 0.2–1.2)
BILIRUB SERPL-MCNC: 2 MG/DL — HIGH (ref 0.2–1.2)
BUN SERPL-MCNC: 2 MG/DL — LOW (ref 7–23)
BUN SERPL-MCNC: 4 MG/DL — LOW (ref 7–23)
BUN SERPL-MCNC: 4 MG/DL — LOW (ref 7–23)
CALCIUM SERPL-MCNC: 8.6 MG/DL — SIGNIFICANT CHANGE UP (ref 8.4–10.5)
CALCIUM SERPL-MCNC: 8.6 MG/DL — SIGNIFICANT CHANGE UP (ref 8.4–10.5)
CALCIUM SERPL-MCNC: 8.8 MG/DL — SIGNIFICANT CHANGE UP (ref 8.4–10.5)
CALCIUM SERPL-MCNC: 8.8 MG/DL — SIGNIFICANT CHANGE UP (ref 8.4–10.5)
CALCIUM SERPL-MCNC: 9 MG/DL — SIGNIFICANT CHANGE UP (ref 8.4–10.5)
CALCIUM SERPL-MCNC: 9 MG/DL — SIGNIFICANT CHANGE UP (ref 8.4–10.5)
CHLORIDE SERPL-SCNC: 94 MMOL/L — LOW (ref 96–108)
CHLORIDE SERPL-SCNC: 96 MMOL/L — SIGNIFICANT CHANGE UP (ref 96–108)
CHLORIDE SERPL-SCNC: 96 MMOL/L — SIGNIFICANT CHANGE UP (ref 96–108)
CO2 SERPL-SCNC: 26 MMOL/L — SIGNIFICANT CHANGE UP (ref 22–31)
CO2 SERPL-SCNC: 27 MMOL/L — SIGNIFICANT CHANGE UP (ref 22–31)
CO2 SERPL-SCNC: 27 MMOL/L — SIGNIFICANT CHANGE UP (ref 22–31)
CREAT SERPL-MCNC: 0.4 MG/DL — LOW (ref 0.5–1.3)
CREAT SERPL-MCNC: 0.43 MG/DL — LOW (ref 0.5–1.3)
CREAT SERPL-MCNC: 0.43 MG/DL — LOW (ref 0.5–1.3)
EGFR: 135 ML/MIN/1.73M2 — SIGNIFICANT CHANGE UP
EGFR: 135 ML/MIN/1.73M2 — SIGNIFICANT CHANGE UP
EGFR: 138 ML/MIN/1.73M2 — SIGNIFICANT CHANGE UP
EOSINOPHIL # BLD AUTO: 0.23 K/UL — SIGNIFICANT CHANGE UP (ref 0–0.5)
EOSINOPHIL # BLD AUTO: 0.23 K/UL — SIGNIFICANT CHANGE UP (ref 0–0.5)
EOSINOPHIL NFR BLD AUTO: 4.4 % — SIGNIFICANT CHANGE UP (ref 0–6)
EOSINOPHIL NFR BLD AUTO: 4.4 % — SIGNIFICANT CHANGE UP (ref 0–6)
GLUCOSE BLDC GLUCOMTR-MCNC: 104 MG/DL — HIGH (ref 70–99)
GLUCOSE BLDC GLUCOMTR-MCNC: 104 MG/DL — HIGH (ref 70–99)
GLUCOSE BLDC GLUCOMTR-MCNC: 117 MG/DL — HIGH (ref 70–99)
GLUCOSE BLDC GLUCOMTR-MCNC: 117 MG/DL — HIGH (ref 70–99)
GLUCOSE BLDC GLUCOMTR-MCNC: 131 MG/DL — HIGH (ref 70–99)
GLUCOSE BLDC GLUCOMTR-MCNC: 131 MG/DL — HIGH (ref 70–99)
GLUCOSE SERPL-MCNC: 127 MG/DL — HIGH (ref 70–99)
GLUCOSE SERPL-MCNC: 127 MG/DL — HIGH (ref 70–99)
GLUCOSE SERPL-MCNC: 139 MG/DL — HIGH (ref 70–99)
GLUCOSE SERPL-MCNC: 139 MG/DL — HIGH (ref 70–99)
GLUCOSE SERPL-MCNC: 143 MG/DL — HIGH (ref 70–99)
GLUCOSE SERPL-MCNC: 143 MG/DL — HIGH (ref 70–99)
HAV IGM SER-ACNC: SIGNIFICANT CHANGE UP
HAV IGM SER-ACNC: SIGNIFICANT CHANGE UP
HBV CORE IGM SER-ACNC: SIGNIFICANT CHANGE UP
HBV CORE IGM SER-ACNC: SIGNIFICANT CHANGE UP
HBV SURFACE AG SER-ACNC: SIGNIFICANT CHANGE UP
HBV SURFACE AG SER-ACNC: SIGNIFICANT CHANGE UP
HCT VFR BLD CALC: 31.2 % — LOW (ref 39–50)
HCT VFR BLD CALC: 31.2 % — LOW (ref 39–50)
HCV AB S/CO SERPL IA: 0.07 S/CO — SIGNIFICANT CHANGE UP
HCV AB S/CO SERPL IA: 0.07 S/CO — SIGNIFICANT CHANGE UP
HCV AB SERPL-IMP: SIGNIFICANT CHANGE UP
HCV AB SERPL-IMP: SIGNIFICANT CHANGE UP
HGB BLD-MCNC: 10.3 G/DL — LOW (ref 13–17)
HGB BLD-MCNC: 10.3 G/DL — LOW (ref 13–17)
IMM GRANULOCYTES NFR BLD AUTO: 0.2 % — SIGNIFICANT CHANGE UP (ref 0–0.9)
IMM GRANULOCYTES NFR BLD AUTO: 0.2 % — SIGNIFICANT CHANGE UP (ref 0–0.9)
INR BLD: 1.27 — HIGH (ref 0.85–1.18)
INR BLD: 1.27 — HIGH (ref 0.85–1.18)
LYMPHOCYTES # BLD AUTO: 1.08 K/UL — SIGNIFICANT CHANGE UP (ref 1–3.3)
LYMPHOCYTES # BLD AUTO: 1.08 K/UL — SIGNIFICANT CHANGE UP (ref 1–3.3)
LYMPHOCYTES # BLD AUTO: 20.8 % — SIGNIFICANT CHANGE UP (ref 13–44)
LYMPHOCYTES # BLD AUTO: 20.8 % — SIGNIFICANT CHANGE UP (ref 13–44)
MAGNESIUM SERPL-MCNC: 1.2 MG/DL — LOW (ref 1.6–2.6)
MAGNESIUM SERPL-MCNC: 1.2 MG/DL — LOW (ref 1.6–2.6)
MAGNESIUM SERPL-MCNC: 1.4 MG/DL — LOW (ref 1.6–2.6)
MAGNESIUM SERPL-MCNC: 1.4 MG/DL — LOW (ref 1.6–2.6)
MCHC RBC-ENTMCNC: 27.3 PG — SIGNIFICANT CHANGE UP (ref 27–34)
MCHC RBC-ENTMCNC: 27.3 PG — SIGNIFICANT CHANGE UP (ref 27–34)
MCHC RBC-ENTMCNC: 33 GM/DL — SIGNIFICANT CHANGE UP (ref 32–36)
MCHC RBC-ENTMCNC: 33 GM/DL — SIGNIFICANT CHANGE UP (ref 32–36)
MCV RBC AUTO: 82.8 FL — SIGNIFICANT CHANGE UP (ref 80–100)
MCV RBC AUTO: 82.8 FL — SIGNIFICANT CHANGE UP (ref 80–100)
MELD SCORE WITH DIALYSIS: 27 POINTS — SIGNIFICANT CHANGE UP
MELD SCORE WITH DIALYSIS: 27 POINTS — SIGNIFICANT CHANGE UP
MELD SCORE WITHOUT DIALYSIS: 17 POINTS — SIGNIFICANT CHANGE UP
MELD SCORE WITHOUT DIALYSIS: 17 POINTS — SIGNIFICANT CHANGE UP
MONOCYTES # BLD AUTO: 0.3 K/UL — SIGNIFICANT CHANGE UP (ref 0–0.9)
MONOCYTES # BLD AUTO: 0.3 K/UL — SIGNIFICANT CHANGE UP (ref 0–0.9)
MONOCYTES NFR BLD AUTO: 5.8 % — SIGNIFICANT CHANGE UP (ref 2–14)
MONOCYTES NFR BLD AUTO: 5.8 % — SIGNIFICANT CHANGE UP (ref 2–14)
NEUTROPHILS # BLD AUTO: 3.52 K/UL — SIGNIFICANT CHANGE UP (ref 1.8–7.4)
NEUTROPHILS # BLD AUTO: 3.52 K/UL — SIGNIFICANT CHANGE UP (ref 1.8–7.4)
NEUTROPHILS NFR BLD AUTO: 67.8 % — SIGNIFICANT CHANGE UP (ref 43–77)
NEUTROPHILS NFR BLD AUTO: 67.8 % — SIGNIFICANT CHANGE UP (ref 43–77)
NRBC # BLD: 0 /100 WBCS — SIGNIFICANT CHANGE UP (ref 0–0)
NRBC # BLD: 0 /100 WBCS — SIGNIFICANT CHANGE UP (ref 0–0)
PHOSPHATE SERPL-MCNC: 1.8 MG/DL — LOW (ref 2.5–4.5)
PHOSPHATE SERPL-MCNC: 1.8 MG/DL — LOW (ref 2.5–4.5)
PHOSPHATE SERPL-MCNC: 2.2 MG/DL — LOW (ref 2.5–4.5)
PHOSPHATE SERPL-MCNC: 2.2 MG/DL — LOW (ref 2.5–4.5)
PLATELET # BLD AUTO: 31 K/UL — LOW (ref 150–400)
PLATELET # BLD AUTO: 31 K/UL — LOW (ref 150–400)
POTASSIUM SERPL-MCNC: 2.9 MMOL/L — CRITICAL LOW (ref 3.5–5.3)
POTASSIUM SERPL-MCNC: 2.9 MMOL/L — CRITICAL LOW (ref 3.5–5.3)
POTASSIUM SERPL-MCNC: 3.3 MMOL/L — LOW (ref 3.5–5.3)
POTASSIUM SERPL-SCNC: 2.9 MMOL/L — CRITICAL LOW (ref 3.5–5.3)
POTASSIUM SERPL-SCNC: 2.9 MMOL/L — CRITICAL LOW (ref 3.5–5.3)
POTASSIUM SERPL-SCNC: 3.3 MMOL/L — LOW (ref 3.5–5.3)
PROT SERPL-MCNC: 7.6 G/DL — SIGNIFICANT CHANGE UP (ref 6–8.3)
PROT SERPL-MCNC: 7.6 G/DL — SIGNIFICANT CHANGE UP (ref 6–8.3)
PROTHROM AB SERPL-ACNC: 14.4 SEC — HIGH (ref 9.5–13)
PROTHROM AB SERPL-ACNC: 14.4 SEC — HIGH (ref 9.5–13)
RBC # BLD: 3.77 M/UL — LOW (ref 4.2–5.8)
RBC # BLD: 3.77 M/UL — LOW (ref 4.2–5.8)
RBC # FLD: 16 % — HIGH (ref 10.3–14.5)
RBC # FLD: 16 % — HIGH (ref 10.3–14.5)
SODIUM SERPL-SCNC: 131 MMOL/L — LOW (ref 135–145)
SODIUM SERPL-SCNC: 131 MMOL/L — LOW (ref 135–145)
SODIUM SERPL-SCNC: 132 MMOL/L — LOW (ref 135–145)
WBC # BLD: 5.19 K/UL — SIGNIFICANT CHANGE UP (ref 3.8–10.5)
WBC # BLD: 5.19 K/UL — SIGNIFICANT CHANGE UP (ref 3.8–10.5)
WBC # FLD AUTO: 5.19 K/UL — SIGNIFICANT CHANGE UP (ref 3.8–10.5)
WBC # FLD AUTO: 5.19 K/UL — SIGNIFICANT CHANGE UP (ref 3.8–10.5)

## 2023-12-30 PROCEDURE — 99233 SBSQ HOSP IP/OBS HIGH 50: CPT | Mod: GC

## 2023-12-30 RX ORDER — POTASSIUM CHLORIDE 20 MEQ
40 PACKET (EA) ORAL EVERY 4 HOURS
Refills: 0 | Status: COMPLETED | OUTPATIENT
Start: 2023-12-30 | End: 2023-12-30

## 2023-12-30 RX ORDER — MAGNESIUM SULFATE 500 MG/ML
4 VIAL (ML) INJECTION ONCE
Refills: 0 | Status: COMPLETED | OUTPATIENT
Start: 2023-12-30 | End: 2023-12-30

## 2023-12-30 RX ORDER — ACETAMINOPHEN 500 MG
650 TABLET ORAL ONCE
Refills: 0 | Status: COMPLETED | OUTPATIENT
Start: 2023-12-30 | End: 2023-12-30

## 2023-12-30 RX ORDER — POTASSIUM CHLORIDE 20 MEQ
80 PACKET (EA) ORAL ONCE
Refills: 0 | Status: COMPLETED | OUTPATIENT
Start: 2023-12-30 | End: 2023-12-30

## 2023-12-30 RX ORDER — MAGNESIUM SULFATE 500 MG/ML
2 VIAL (ML) INJECTION ONCE
Refills: 0 | Status: COMPLETED | OUTPATIENT
Start: 2023-12-30 | End: 2023-12-30

## 2023-12-30 RX ORDER — POTASSIUM CHLORIDE 20 MEQ
10 PACKET (EA) ORAL
Refills: 0 | Status: DISCONTINUED | OUTPATIENT
Start: 2023-12-30 | End: 2023-12-31

## 2023-12-30 RX ORDER — POTASSIUM CHLORIDE 20 MEQ
10 PACKET (EA) ORAL
Refills: 0 | Status: COMPLETED | OUTPATIENT
Start: 2023-12-30 | End: 2023-12-30

## 2023-12-30 RX ORDER — SODIUM,POTASSIUM PHOSPHATES 278-250MG
1 POWDER IN PACKET (EA) ORAL ONCE
Refills: 0 | Status: COMPLETED | OUTPATIENT
Start: 2023-12-30 | End: 2023-12-30

## 2023-12-30 RX ORDER — POTASSIUM CHLORIDE 20 MEQ
10 PACKET (EA) ORAL
Refills: 0 | Status: DISCONTINUED | OUTPATIENT
Start: 2023-12-30 | End: 2023-12-30

## 2023-12-30 RX ADMIN — Medication 1 PACKET(S): at 21:47

## 2023-12-30 RX ADMIN — Medication 25 GRAM(S): at 21:47

## 2023-12-30 RX ADMIN — PANTOPRAZOLE SODIUM 40 MILLIGRAM(S): 20 TABLET, DELAYED RELEASE ORAL at 17:30

## 2023-12-30 RX ADMIN — Medication 1 TABLET(S): at 11:33

## 2023-12-30 RX ADMIN — Medication 100 MILLIEQUIVALENT(S): at 11:33

## 2023-12-30 RX ADMIN — Medication 25 GRAM(S): at 09:31

## 2023-12-30 RX ADMIN — OCTREOTIDE ACETATE 10 MICROGRAM(S)/HR: 200 INJECTION, SOLUTION INTRAVENOUS; SUBCUTANEOUS at 02:36

## 2023-12-30 RX ADMIN — Medication 105 MILLIGRAM(S): at 13:49

## 2023-12-30 RX ADMIN — CEFTRIAXONE 100 MILLIGRAM(S): 500 INJECTION, POWDER, FOR SOLUTION INTRAMUSCULAR; INTRAVENOUS at 18:05

## 2023-12-30 RX ADMIN — PANTOPRAZOLE SODIUM 40 MILLIGRAM(S): 20 TABLET, DELAYED RELEASE ORAL at 06:11

## 2023-12-30 RX ADMIN — Medication 1 MILLIGRAM(S): at 11:33

## 2023-12-30 RX ADMIN — Medication 40 MILLIEQUIVALENT(S): at 13:25

## 2023-12-30 RX ADMIN — Medication 40 MILLIEQUIVALENT(S): at 10:39

## 2023-12-30 RX ADMIN — Medication 100 MILLIEQUIVALENT(S): at 10:39

## 2023-12-30 RX ADMIN — Medication 100 MILLIEQUIVALENT(S): at 13:25

## 2023-12-30 NOTE — PROGRESS NOTE ADULT - PROBLEM SELECTOR PLAN 3
F: None  E: Replete lytes PRN K<4, Mg <2  N: Clear liquid diet  Lines/Ahumada: 20G RUE and LUE, 18G LUE x1  PPx: SCDs bilateral  Code: FULL CODE  Dispo: 7U

## 2023-12-30 NOTE — PROGRESS NOTE ADULT - PROBLEM SELECTOR PLAN 2
#Decompensated Cirrhosis 2/2 ETOH use disorder.  Patient with cirrhosis presented with 4 episodes of hematemesis, CTAP showing cirrhosis w/ small diffuse esophageal varices. INR wnl. Hb drop from 13.5-->10 and Plt dropped from 61 to 30 on 12/28. GI consulted for hematemesis, started pt on octreotide drip and CTX ppx. On 12/29,  EGD without esophageal varices, showed portal HTN gastropathy and endo-clip at gastric fundus. 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   - CTX 1g IV qd x7 days for PPX (12/28- )  - Octreotide gtt x3 days (12/28- )  - C/w Protonix 40mg IV BID  - Continue to monitor daily MELD   - Aspiration precautions  - F/u hepatitis panel
#Decompensated Cirrhosis 2/2 ETOH use disorder.  -Patient with cirrhosis presented with 4 episodes of hematemesis, CTAP showing cirrhosis w/ small diffuse esophageal varices. INR wnl. Hb drop from 13.5-->10 and Plt dropped from 61 to 30 on 12/28. GI consulted for hematemesis, started pt on octreotide drip and CTX ppx. On 12/29, EGD with small esophageal varices, showed portal HTN gastropathy and endo-clip at gastric fundus. 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   -12/30: Hepatitis panel negative, MELD 17    Plan:  - CTX 1g IV qd x 5 days for PPX (12/28- 12/30, per GI, cefpodoxime okay for 01/01-01/02)  - Octreotide gtt xx 3 days (12/28-30)  - Ensure hepatology follow-up for initiation of BB  - Continue Protonix 40mg IV BID  - Continue to monitor daily MELD   - Follow up AMA, anti-smooth muscle Ab, ferritin, alpha-1 antitrypsin for other cirrhosis causes  - Duplex U/S abdomen to assess portal HTN  - Aspiration precautions

## 2023-12-30 NOTE — PROGRESS NOTE ADULT - ASSESSMENT
43-year-old male with cirrhosis and alcohol use disorder with hx of withdrawal seizure (7 years ago) and intubation presented to [facility] for 4 episodes of hematemesis, initially admitted to MICU for management of alcohol withdrawal with CIWA >15 w/ GI consulted for hematemesis, started on octreotide drip and CTX i/s/o small varices found on EGD. Now stable for SD to F. 43-year-old male with cirrhosis and alcohol use disorder with hx of withdrawal seizure (7 years ago) and intubation presented to [facility] for 4 episodes of hematemesis, initially admitted to MICU for management of alcohol withdrawal with CIWA >15 w/ GI consulted for hematemesis, started on octreotide drip and CTX i/s/o small varices found on EGD. Now stable for SD to F.

## 2023-12-30 NOTE — PROGRESS NOTE ADULT - PROBLEM SELECTOR PLAN 1
Pt with alcohol use disorder (withdrawal seizure 7 years ago, intubated in the past due to w/d) and cirrhosis. Last drink on 12/26. DUSTIN 350. On2/28, CIWA >15; with significant improvement w/ Valium and ativan. On 12/29, CIWA of 0.   - c/w CIWA protocol   - c/w ativan 2mg IV q4h PRN CIWA>8  - c/w thiamine 500mg IV qd  - start folate and mulitvitamin once diet is advanced to PO
Patient with alcohol use disorder (withdrawal seizure 7 years ago, intubated in the past due to w/d) and cirrhosis. Last drink on 12/26. DUSTIN 350. On 12/28, CIWA >15; with significant improvement w/ Valium and ativan. On 12/29, CIWA of 0.     Plan:  - Continue CIWA protocol   - Ativan 2mg IV q4h PRN CIWA>8  - Thiamine 500mg IV qd  - Folate, multivitamin

## 2023-12-30 NOTE — PROGRESS NOTE ADULT - SUBJECTIVE AND OBJECTIVE BOX
OVERNIGHT EVENTS:    SUBJECTIVE / INTERVAL HPI: Patient seen and examined at bedside.     VITAL SIGNS:  Vital Signs Last 24 Hrs  T(C): 36.6 (30 Dec 2023 05:35), Max: 37 (29 Dec 2023 18:05)  T(F): 97.9 (30 Dec 2023 05:35), Max: 98.6 (29 Dec 2023 18:05)  HR: 83 (30 Dec 2023 05:35) (82 - 131)  BP: 117/74 (30 Dec 2023 05:35) (89/56 - 169/89)  BP(mean): 96 (29 Dec 2023 21:32) (67 - 117)  RR: 17 (30 Dec 2023 05:35) (15 - 25)  SpO2: 98% (30 Dec 2023 05:35) (94% - 99%)    Parameters below as of 30 Dec 2023 05:35  Patient On (Oxygen Delivery Method): room air      I&O's Summary    29 Dec 2023 07:01  -  30 Dec 2023 07:00  --------------------------------------------------------  IN: 545 mL / OUT: 1775 mL / NET: -1230 mL        PHYSICAL EXAM:    General: WDWN  HEENT: NC/AT; PERRL, anicteric sclera; MMM  Neck: supple  Cardiovascular: +S1/S2; RRR  Respiratory: CTA B/L; no W/R/R  Gastrointestinal: soft, NT/ND; +BSx4  Extremities: WWP; no edema, clubbing or cyanosis  Vascular: 2+ radial, DP/PT pulses B/L  Neurological: AAOx3; no focal deficits    MEDICATIONS:  MEDICATIONS  (STANDING):  cefTRIAXone   IVPB 1000 milliGRAM(s) IV Intermittent every 24 hours  chlorhexidine 2% Cloths 1 Application(s) Topical <User Schedule>  folic acid 1 milliGRAM(s) Oral daily  influenza   Vaccine 0.5 milliLiter(s) IntraMuscular once  multivitamin 1 Tablet(s) Oral daily  octreotide  Infusion 50 MICROgram(s)/Hr (10 mL/Hr) IV Continuous <Continuous>  pantoprazole  Injectable 40 milliGRAM(s) IV Push every 12 hours  thiamine IVPB 500 milliGRAM(s) IV Intermittent every 24 hours    MEDICATIONS  (PRN):  LORazepam   Injectable 2 milliGRAM(s) IV Push every 2 hours PRN CIWA >8  melatonin 3 milliGRAM(s) Oral at bedtime PRN Insomnia      ALLERGIES:  Allergies    No Known Allergies    Intolerances        LABS:                        9.9    4.59  )-----------( 26       ( 29 Dec 2023 04:57 )             29.9     12-29    132<L>  |  96  |  7   ----------------------------<  144<H>  3.5   |  22  |  0.44<L>    Ca    7.6<L>      29 Dec 2023 04:57  Phos  2.2     12-28  Mg     1.2     12-28    TPro  7.5  /  Alb  3.6  /  TBili  1.9<H>  /  DBili  x   /  AST  79<H>  /  ALT  37  /  AlkPhos  148<H>  12-29    PT/INR - ( 29 Dec 2023 04:57 )   PT: 13.1 sec;   INR: 1.15          PTT - ( 28 Dec 2023 18:20 )  PTT:28.9 sec  Urinalysis Basic - ( 29 Dec 2023 04:57 )    Color: x / Appearance: x / SG: x / pH: x  Gluc: 144 mg/dL / Ketone: x  / Bili: x / Urobili: x   Blood: x / Protein: x / Nitrite: x   Leuk Esterase: x / RBC: x / WBC x   Sq Epi: x / Non Sq Epi: x / Bacteria: x      CAPILLARY BLOOD GLUCOSE      POCT Blood Glucose.: 104 mg/dL (30 Dec 2023 06:18)      RADIOLOGY & ADDITIONAL TESTS: Reviewed.   OVERNIGHT EVENTS: No AOE    SUBJECTIVE / INTERVAL HPI: Patient seen and examined at bedside. Sleeping comfortably upon arrival of examiner, ALYSSIA 0. Complains of mild abdominal TTP, but no other concerns at this time. +BM w/o blood. Denies CP, SOB, N/V, sweats, tremors, anxiety, or hallucinations.     VITAL SIGNS:  Vital Signs Last 24 Hrs  T(C): 36.6 (30 Dec 2023 05:35), Max: 37 (29 Dec 2023 18:05)  T(F): 97.9 (30 Dec 2023 05:35), Max: 98.6 (29 Dec 2023 18:05)  HR: 83 (30 Dec 2023 05:35) (82 - 131)  BP: 117/74 (30 Dec 2023 05:35) (89/56 - 169/89)  BP(mean): 96 (29 Dec 2023 21:32) (67 - 117)  RR: 17 (30 Dec 2023 05:35) (15 - 25)  SpO2: 98% (30 Dec 2023 05:35) (94% - 99%)    Parameters below as of 30 Dec 2023 05:35  Patient On (Oxygen Delivery Method): room air      I&O's Summary    29 Dec 2023 07:01  -  30 Dec 2023 07:00  --------------------------------------------------------  IN: 545 mL / OUT: 1775 mL / NET: -1230 mL        PHYSICAL EXAM:    General: WDWN, sleeping  HEENT: NC/AT; PERRL, anicteric sclera; MMM  Neck: supple  Cardiovascular: +S1/S2; RRR  Respiratory: CTA B/L; no W/R/R  Gastrointestinal: soft, mild diffuse TTP, ND; +BSx4  Extremities: WWP; no edema, clubbing or cyanosis  Vascular: 2+ radial, DP/PT pulses B/L  Neurological: AAOx3; no focal deficits    MEDICATIONS:  MEDICATIONS  (STANDING):  cefTRIAXone   IVPB 1000 milliGRAM(s) IV Intermittent every 24 hours  chlorhexidine 2% Cloths 1 Application(s) Topical <User Schedule>  folic acid 1 milliGRAM(s) Oral daily  influenza   Vaccine 0.5 milliLiter(s) IntraMuscular once  multivitamin 1 Tablet(s) Oral daily  octreotide  Infusion 50 MICROgram(s)/Hr (10 mL/Hr) IV Continuous <Continuous>  pantoprazole  Injectable 40 milliGRAM(s) IV Push every 12 hours  thiamine IVPB 500 milliGRAM(s) IV Intermittent every 24 hours    MEDICATIONS  (PRN):  LORazepam   Injectable 2 milliGRAM(s) IV Push every 2 hours PRN CIWA >8  melatonin 3 milliGRAM(s) Oral at bedtime PRN Insomnia      ALLERGIES:  Allergies    No Known Allergies    Intolerances        LABS:                        9.9    4.59  )-----------( 26       ( 29 Dec 2023 04:57 )             29.9     12-29    132<L>  |  96  |  7   ----------------------------<  144<H>  3.5   |  22  |  0.44<L>    Ca    7.6<L>      29 Dec 2023 04:57  Phos  2.2     12-28  Mg     1.2     12-28    TPro  7.5  /  Alb  3.6  /  TBili  1.9<H>  /  DBili  x   /  AST  79<H>  /  ALT  37  /  AlkPhos  148<H>  12-29    PT/INR - ( 29 Dec 2023 04:57 )   PT: 13.1 sec;   INR: 1.15          PTT - ( 28 Dec 2023 18:20 )  PTT:28.9 sec  Urinalysis Basic - ( 29 Dec 2023 04:57 )    Color: x / Appearance: x / SG: x / pH: x  Gluc: 144 mg/dL / Ketone: x  / Bili: x / Urobili: x   Blood: x / Protein: x / Nitrite: x   Leuk Esterase: x / RBC: x / WBC x   Sq Epi: x / Non Sq Epi: x / Bacteria: x      CAPILLARY BLOOD GLUCOSE      POCT Blood Glucose.: 104 mg/dL (30 Dec 2023 06:18)      RADIOLOGY & ADDITIONAL TESTS: Reviewed.

## 2023-12-30 NOTE — PROGRESS NOTE ADULT - ATTENDING COMMENTS
42 yo [place]an-speaking M with PMHx cirrhosis, etOH use disorder (hx of withdrawal with seizures, intubations) [place] admitted to MICU for further management of etOH withdrawal and monitoring for UGIB requiring intubation for EGD 12/29 now extubated and stable for stepdown to [facility] for ongoing management.     Labs and imaging reviewed    Problem List  #Acute Decompensate Cirrhosis  #EtOH Withdrawal  #Acute Blood Loss Anemia  #UGIB  #Thrombocytopenia  #Hypomagnemia   #Hypokalemia  #Hypophosphatemia    Plan:  -Complete Etiology work up for cirrhosis, likely due to EtOH but other causes should be excluded  -Discussion with GI, given no active bleeding with varices with no signs of ascites question utility of antibiotic ppx and exposure to antibiotics  -Octreotide will be complete by tomorrow, given no stigmata again would prefer discontinuation if no indication  -PLT likely due to etoh and liver disease  -Hgb stable  -Ativan CIWA protocol    Anticipate D/C within 24h 42 yo [place]-speaking M with PMHx cirrhosis, etOH use disorder (hx of withdrawal with seizures, intubations) [place] admitted to MICU for further management of etOH withdrawal and monitoring for UGIB requiring intubation for EGD 12/29 now extubated and stable for stepdown to [facility] for ongoing management.     Labs and imaging reviewed    Problem List  #Acute Decompensate Cirrhosis  #EtOH Withdrawal  #Acute Blood Loss Anemia  #UGIB  #Thrombocytopenia  #Hypomagnemia   #Hypokalemia  #Hypophosphatemia    Plan:  -Complete Etiology work up for cirrhosis, likely due to EtOH but other causes should be excluded  -Discussion with GI, given no active bleeding with varices with no signs of ascites question utility of antibiotic ppx and exposure to antibiotics  -Octreotide will be complete by tomorrow, given no stigmata again would prefer discontinuation if no indication  -PLT likely due to etoh and liver disease  -Hgb stable  -Ativan CIWA protocol    Anticipate D/C within 24h

## 2023-12-31 VITALS
OXYGEN SATURATION: 97 % | TEMPERATURE: 98 F | DIASTOLIC BLOOD PRESSURE: 79 MMHG | RESPIRATION RATE: 18 BRPM | HEART RATE: 79 BPM | SYSTOLIC BLOOD PRESSURE: 119 MMHG

## 2023-12-31 LAB
A1AT SERPL-MCNC: 160 MG/DL — SIGNIFICANT CHANGE UP (ref 90–200)
A1AT SERPL-MCNC: 160 MG/DL — SIGNIFICANT CHANGE UP (ref 90–200)
ALBUMIN SERPL ELPH-MCNC: 3.7 G/DL — SIGNIFICANT CHANGE UP (ref 3.3–5)
ALBUMIN SERPL ELPH-MCNC: 3.7 G/DL — SIGNIFICANT CHANGE UP (ref 3.3–5)
ALP SERPL-CCNC: 156 U/L — HIGH (ref 40–120)
ALP SERPL-CCNC: 156 U/L — HIGH (ref 40–120)
ALT FLD-CCNC: 46 U/L — HIGH (ref 10–45)
ALT FLD-CCNC: 46 U/L — HIGH (ref 10–45)
ANION GAP SERPL CALC-SCNC: 8 MMOL/L — SIGNIFICANT CHANGE UP (ref 5–17)
ANION GAP SERPL CALC-SCNC: 8 MMOL/L — SIGNIFICANT CHANGE UP (ref 5–17)
ANION GAP SERPL CALC-SCNC: 9 MMOL/L — SIGNIFICANT CHANGE UP (ref 5–17)
ANION GAP SERPL CALC-SCNC: 9 MMOL/L — SIGNIFICANT CHANGE UP (ref 5–17)
APTT BLD: 28.7 SEC — SIGNIFICANT CHANGE UP (ref 24.5–35.6)
APTT BLD: 28.7 SEC — SIGNIFICANT CHANGE UP (ref 24.5–35.6)
AST SERPL-CCNC: 85 U/L — HIGH (ref 10–40)
AST SERPL-CCNC: 85 U/L — HIGH (ref 10–40)
BASOPHILS # BLD AUTO: 0.03 K/UL — SIGNIFICANT CHANGE UP (ref 0–0.2)
BASOPHILS # BLD AUTO: 0.03 K/UL — SIGNIFICANT CHANGE UP (ref 0–0.2)
BASOPHILS NFR BLD AUTO: 0.7 % — SIGNIFICANT CHANGE UP (ref 0–2)
BASOPHILS NFR BLD AUTO: 0.7 % — SIGNIFICANT CHANGE UP (ref 0–2)
BILIRUB SERPL-MCNC: 1.5 MG/DL — HIGH (ref 0.2–1.2)
BILIRUB SERPL-MCNC: 1.5 MG/DL — HIGH (ref 0.2–1.2)
BLD GP AB SCN SERPL QL: NEGATIVE — SIGNIFICANT CHANGE UP
BLD GP AB SCN SERPL QL: NEGATIVE — SIGNIFICANT CHANGE UP
BUN SERPL-MCNC: 2 MG/DL — LOW (ref 7–23)
CALCIUM SERPL-MCNC: 8.7 MG/DL — SIGNIFICANT CHANGE UP (ref 8.4–10.5)
CALCIUM SERPL-MCNC: 8.7 MG/DL — SIGNIFICANT CHANGE UP (ref 8.4–10.5)
CALCIUM SERPL-MCNC: 8.8 MG/DL — SIGNIFICANT CHANGE UP (ref 8.4–10.5)
CALCIUM SERPL-MCNC: 8.8 MG/DL — SIGNIFICANT CHANGE UP (ref 8.4–10.5)
CHLORIDE SERPL-SCNC: 93 MMOL/L — LOW (ref 96–108)
CHLORIDE SERPL-SCNC: 93 MMOL/L — LOW (ref 96–108)
CHLORIDE SERPL-SCNC: 94 MMOL/L — LOW (ref 96–108)
CHLORIDE SERPL-SCNC: 94 MMOL/L — LOW (ref 96–108)
CO2 SERPL-SCNC: 27 MMOL/L — SIGNIFICANT CHANGE UP (ref 22–31)
CO2 SERPL-SCNC: 27 MMOL/L — SIGNIFICANT CHANGE UP (ref 22–31)
CO2 SERPL-SCNC: 28 MMOL/L — SIGNIFICANT CHANGE UP (ref 22–31)
CO2 SERPL-SCNC: 28 MMOL/L — SIGNIFICANT CHANGE UP (ref 22–31)
CREAT SERPL-MCNC: 0.42 MG/DL — LOW (ref 0.5–1.3)
EGFR: 136 ML/MIN/1.73M2 — SIGNIFICANT CHANGE UP
EOSINOPHIL # BLD AUTO: 0.21 K/UL — SIGNIFICANT CHANGE UP (ref 0–0.5)
EOSINOPHIL # BLD AUTO: 0.21 K/UL — SIGNIFICANT CHANGE UP (ref 0–0.5)
EOSINOPHIL NFR BLD AUTO: 4.7 % — SIGNIFICANT CHANGE UP (ref 0–6)
EOSINOPHIL NFR BLD AUTO: 4.7 % — SIGNIFICANT CHANGE UP (ref 0–6)
FERRITIN SERPL-MCNC: 359 NG/ML — SIGNIFICANT CHANGE UP (ref 30–400)
FERRITIN SERPL-MCNC: 359 NG/ML — SIGNIFICANT CHANGE UP (ref 30–400)
GLUCOSE BLDC GLUCOMTR-MCNC: 117 MG/DL — HIGH (ref 70–99)
GLUCOSE BLDC GLUCOMTR-MCNC: 117 MG/DL — HIGH (ref 70–99)
GLUCOSE BLDC GLUCOMTR-MCNC: 120 MG/DL — HIGH (ref 70–99)
GLUCOSE BLDC GLUCOMTR-MCNC: 120 MG/DL — HIGH (ref 70–99)
GLUCOSE BLDC GLUCOMTR-MCNC: 126 MG/DL — HIGH (ref 70–99)
GLUCOSE BLDC GLUCOMTR-MCNC: 126 MG/DL — HIGH (ref 70–99)
GLUCOSE SERPL-MCNC: 104 MG/DL — HIGH (ref 70–99)
GLUCOSE SERPL-MCNC: 104 MG/DL — HIGH (ref 70–99)
GLUCOSE SERPL-MCNC: 119 MG/DL — HIGH (ref 70–99)
GLUCOSE SERPL-MCNC: 119 MG/DL — HIGH (ref 70–99)
HCT VFR BLD CALC: 31.4 % — LOW (ref 39–50)
HCT VFR BLD CALC: 31.4 % — LOW (ref 39–50)
HGB BLD-MCNC: 10.3 G/DL — LOW (ref 13–17)
HGB BLD-MCNC: 10.3 G/DL — LOW (ref 13–17)
IMM GRANULOCYTES NFR BLD AUTO: 0.2 % — SIGNIFICANT CHANGE UP (ref 0–0.9)
IMM GRANULOCYTES NFR BLD AUTO: 0.2 % — SIGNIFICANT CHANGE UP (ref 0–0.9)
INR BLD: 1.2 — HIGH (ref 0.85–1.18)
INR BLD: 1.2 — HIGH (ref 0.85–1.18)
LYMPHOCYTES # BLD AUTO: 1.2 K/UL — SIGNIFICANT CHANGE UP (ref 1–3.3)
LYMPHOCYTES # BLD AUTO: 1.2 K/UL — SIGNIFICANT CHANGE UP (ref 1–3.3)
LYMPHOCYTES # BLD AUTO: 27 % — SIGNIFICANT CHANGE UP (ref 13–44)
LYMPHOCYTES # BLD AUTO: 27 % — SIGNIFICANT CHANGE UP (ref 13–44)
MAGNESIUM SERPL-MCNC: 1.8 MG/DL — SIGNIFICANT CHANGE UP (ref 1.6–2.6)
MAGNESIUM SERPL-MCNC: 1.8 MG/DL — SIGNIFICANT CHANGE UP (ref 1.6–2.6)
MCHC RBC-ENTMCNC: 27.1 PG — SIGNIFICANT CHANGE UP (ref 27–34)
MCHC RBC-ENTMCNC: 27.1 PG — SIGNIFICANT CHANGE UP (ref 27–34)
MCHC RBC-ENTMCNC: 32.8 GM/DL — SIGNIFICANT CHANGE UP (ref 32–36)
MCHC RBC-ENTMCNC: 32.8 GM/DL — SIGNIFICANT CHANGE UP (ref 32–36)
MCV RBC AUTO: 82.6 FL — SIGNIFICANT CHANGE UP (ref 80–100)
MCV RBC AUTO: 82.6 FL — SIGNIFICANT CHANGE UP (ref 80–100)
MONOCYTES # BLD AUTO: 0.34 K/UL — SIGNIFICANT CHANGE UP (ref 0–0.9)
MONOCYTES # BLD AUTO: 0.34 K/UL — SIGNIFICANT CHANGE UP (ref 0–0.9)
MONOCYTES NFR BLD AUTO: 7.7 % — SIGNIFICANT CHANGE UP (ref 2–14)
MONOCYTES NFR BLD AUTO: 7.7 % — SIGNIFICANT CHANGE UP (ref 2–14)
NEUTROPHILS # BLD AUTO: 2.65 K/UL — SIGNIFICANT CHANGE UP (ref 1.8–7.4)
NEUTROPHILS # BLD AUTO: 2.65 K/UL — SIGNIFICANT CHANGE UP (ref 1.8–7.4)
NEUTROPHILS NFR BLD AUTO: 59.7 % — SIGNIFICANT CHANGE UP (ref 43–77)
NEUTROPHILS NFR BLD AUTO: 59.7 % — SIGNIFICANT CHANGE UP (ref 43–77)
NRBC # BLD: 0 /100 WBCS — SIGNIFICANT CHANGE UP (ref 0–0)
NRBC # BLD: 0 /100 WBCS — SIGNIFICANT CHANGE UP (ref 0–0)
PHOSPHATE SERPL-MCNC: 1.7 MG/DL — LOW (ref 2.5–4.5)
PHOSPHATE SERPL-MCNC: 1.7 MG/DL — LOW (ref 2.5–4.5)
PLATELET # BLD AUTO: 42 K/UL — LOW (ref 150–400)
PLATELET # BLD AUTO: 42 K/UL — LOW (ref 150–400)
POTASSIUM SERPL-MCNC: 3.2 MMOL/L — LOW (ref 3.5–5.3)
POTASSIUM SERPL-MCNC: 3.2 MMOL/L — LOW (ref 3.5–5.3)
POTASSIUM SERPL-MCNC: 3.7 MMOL/L — SIGNIFICANT CHANGE UP (ref 3.5–5.3)
POTASSIUM SERPL-MCNC: 3.7 MMOL/L — SIGNIFICANT CHANGE UP (ref 3.5–5.3)
POTASSIUM SERPL-SCNC: 3.2 MMOL/L — LOW (ref 3.5–5.3)
POTASSIUM SERPL-SCNC: 3.2 MMOL/L — LOW (ref 3.5–5.3)
POTASSIUM SERPL-SCNC: 3.7 MMOL/L — SIGNIFICANT CHANGE UP (ref 3.5–5.3)
POTASSIUM SERPL-SCNC: 3.7 MMOL/L — SIGNIFICANT CHANGE UP (ref 3.5–5.3)
PROT SERPL-MCNC: 7.3 G/DL — SIGNIFICANT CHANGE UP (ref 6–8.3)
PROT SERPL-MCNC: 7.3 G/DL — SIGNIFICANT CHANGE UP (ref 6–8.3)
PROTHROM AB SERPL-ACNC: 13.6 SEC — HIGH (ref 9.5–13)
PROTHROM AB SERPL-ACNC: 13.6 SEC — HIGH (ref 9.5–13)
RBC # BLD: 3.8 M/UL — LOW (ref 4.2–5.8)
RBC # BLD: 3.8 M/UL — LOW (ref 4.2–5.8)
RBC # FLD: 16.4 % — HIGH (ref 10.3–14.5)
RBC # FLD: 16.4 % — HIGH (ref 10.3–14.5)
RH IG SCN BLD-IMP: POSITIVE — SIGNIFICANT CHANGE UP
RH IG SCN BLD-IMP: POSITIVE — SIGNIFICANT CHANGE UP
SODIUM SERPL-SCNC: 128 MMOL/L — LOW (ref 135–145)
SODIUM SERPL-SCNC: 128 MMOL/L — LOW (ref 135–145)
SODIUM SERPL-SCNC: 131 MMOL/L — LOW (ref 135–145)
SODIUM SERPL-SCNC: 131 MMOL/L — LOW (ref 135–145)
WBC # BLD: 4.44 K/UL — SIGNIFICANT CHANGE UP (ref 3.8–10.5)
WBC # BLD: 4.44 K/UL — SIGNIFICANT CHANGE UP (ref 3.8–10.5)
WBC # FLD AUTO: 4.44 K/UL — SIGNIFICANT CHANGE UP (ref 3.8–10.5)
WBC # FLD AUTO: 4.44 K/UL — SIGNIFICANT CHANGE UP (ref 3.8–10.5)

## 2023-12-31 PROCEDURE — 96375 TX/PRO/DX INJ NEW DRUG ADDON: CPT

## 2023-12-31 PROCEDURE — 87086 URINE CULTURE/COLONY COUNT: CPT

## 2023-12-31 PROCEDURE — 82103 ALPHA-1-ANTITRYPSIN TOTAL: CPT

## 2023-12-31 PROCEDURE — 85027 COMPLETE CBC AUTOMATED: CPT

## 2023-12-31 PROCEDURE — 86901 BLOOD TYPING SEROLOGIC RH(D): CPT

## 2023-12-31 PROCEDURE — 85730 THROMBOPLASTIN TIME PARTIAL: CPT

## 2023-12-31 PROCEDURE — 81001 URINALYSIS AUTO W/SCOPE: CPT

## 2023-12-31 PROCEDURE — 82962 GLUCOSE BLOOD TEST: CPT

## 2023-12-31 PROCEDURE — 99285 EMERGENCY DEPT VISIT HI MDM: CPT

## 2023-12-31 PROCEDURE — 83690 ASSAY OF LIPASE: CPT

## 2023-12-31 PROCEDURE — 80076 HEPATIC FUNCTION PANEL: CPT

## 2023-12-31 PROCEDURE — 80053 COMPREHEN METABOLIC PANEL: CPT

## 2023-12-31 PROCEDURE — 86381 MITOCHONDRIAL ANTIBODY EACH: CPT

## 2023-12-31 PROCEDURE — 86850 RBC ANTIBODY SCREEN: CPT

## 2023-12-31 PROCEDURE — 85610 PROTHROMBIN TIME: CPT

## 2023-12-31 PROCEDURE — 84100 ASSAY OF PHOSPHORUS: CPT

## 2023-12-31 PROCEDURE — 74177 CT ABD & PELVIS W/CONTRAST: CPT

## 2023-12-31 PROCEDURE — 86255 FLUORESCENT ANTIBODY SCREEN: CPT

## 2023-12-31 PROCEDURE — 80048 BASIC METABOLIC PNL TOTAL CA: CPT

## 2023-12-31 PROCEDURE — 99239 HOSP IP/OBS DSCHRG MGMT >30: CPT | Mod: GC

## 2023-12-31 PROCEDURE — 80307 DRUG TEST PRSMV CHEM ANLYZR: CPT

## 2023-12-31 PROCEDURE — 93975 VASCULAR STUDY: CPT

## 2023-12-31 PROCEDURE — 71045 X-RAY EXAM CHEST 1 VIEW: CPT

## 2023-12-31 PROCEDURE — 86900 BLOOD TYPING SEROLOGIC ABO: CPT

## 2023-12-31 PROCEDURE — 85025 COMPLETE CBC W/AUTO DIFF WBC: CPT

## 2023-12-31 PROCEDURE — 83735 ASSAY OF MAGNESIUM: CPT

## 2023-12-31 PROCEDURE — 80074 ACUTE HEPATITIS PANEL: CPT

## 2023-12-31 PROCEDURE — 82803 BLOOD GASES ANY COMBINATION: CPT

## 2023-12-31 PROCEDURE — 93975 VASCULAR STUDY: CPT | Mod: 26

## 2023-12-31 PROCEDURE — 36415 COLL VENOUS BLD VENIPUNCTURE: CPT

## 2023-12-31 PROCEDURE — 96376 TX/PRO/DX INJ SAME DRUG ADON: CPT

## 2023-12-31 PROCEDURE — 83605 ASSAY OF LACTIC ACID: CPT

## 2023-12-31 PROCEDURE — 86038 ANTINUCLEAR ANTIBODIES: CPT

## 2023-12-31 PROCEDURE — 96374 THER/PROPH/DIAG INJ IV PUSH: CPT

## 2023-12-31 PROCEDURE — 82728 ASSAY OF FERRITIN: CPT

## 2023-12-31 RX ORDER — THIAMINE MONONITRATE (VIT B1) 100 MG
1 TABLET ORAL
Qty: 30 | Refills: 0
Start: 2023-12-31 | End: 2024-01-29

## 2023-12-31 RX ORDER — POTASSIUM PHOSPHATE, MONOBASIC POTASSIUM PHOSPHATE, DIBASIC 236; 224 MG/ML; MG/ML
30 INJECTION, SOLUTION INTRAVENOUS ONCE
Refills: 0 | Status: COMPLETED | OUTPATIENT
Start: 2023-12-31 | End: 2023-12-31

## 2023-12-31 RX ORDER — FOLIC ACID 0.8 MG
1 TABLET ORAL
Qty: 30 | Refills: 0
Start: 2023-12-31 | End: 2024-01-29

## 2023-12-31 RX ORDER — POTASSIUM CHLORIDE 20 MEQ
40 PACKET (EA) ORAL DAILY
Refills: 0 | Status: DISCONTINUED | OUTPATIENT
Start: 2023-12-31 | End: 2023-12-31

## 2023-12-31 RX ORDER — PANTOPRAZOLE SODIUM 20 MG/1
1 TABLET, DELAYED RELEASE ORAL
Qty: 30 | Refills: 0
Start: 2023-12-31 | End: 2024-01-29

## 2023-12-31 RX ORDER — POTASSIUM CHLORIDE 20 MEQ
10 PACKET (EA) ORAL
Refills: 0 | Status: COMPLETED | OUTPATIENT
Start: 2023-12-31 | End: 2023-12-31

## 2023-12-31 RX ADMIN — Medication 100 MILLIEQUIVALENT(S): at 06:12

## 2023-12-31 RX ADMIN — Medication 40 MILLIEQUIVALENT(S): at 11:04

## 2023-12-31 RX ADMIN — Medication 100 MILLIEQUIVALENT(S): at 07:58

## 2023-12-31 RX ADMIN — Medication 1 TABLET(S): at 11:05

## 2023-12-31 RX ADMIN — POTASSIUM PHOSPHATE, MONOBASIC POTASSIUM PHOSPHATE, DIBASIC 83.33 MILLIMOLE(S): 236; 224 INJECTION, SOLUTION INTRAVENOUS at 12:12

## 2023-12-31 RX ADMIN — PANTOPRAZOLE SODIUM 40 MILLIGRAM(S): 20 TABLET, DELAYED RELEASE ORAL at 06:12

## 2023-12-31 RX ADMIN — Medication 100 MILLIEQUIVALENT(S): at 03:47

## 2023-12-31 RX ADMIN — Medication 1 MILLIGRAM(S): at 11:05

## 2023-12-31 NOTE — DISCHARGE NOTE PROVIDER - HOSPITAL COURSE
#Discharge: do not delete    Patient is a 43 year old male with cirrhosis and alcohol use disorder with hx of withdrawal seizure (7 years ago) and intubation who presented to [facility] for 4 episodes of hematemesis, initially admitted to MICU for management of alcohol withdrawal with CIWA >15 w/ GI consulted for hematemesis, started on octreotide drip and CTX i/s/o small varices found on EGD.      #Alcohol dependence with withdrawal  - counseled on alcohol cessation  - c/w thiamine 100 mg PO QD  - c/w folic acid 1 mg PO QD  - c/w MV PO QD    #Decompensation of cirrhosis of liver  CT AP showing cirrhosis w/ small diffuse esophageal varices. GI consulted for hematemesis, started pt on octreotide drip and CTX ppx. On 12/29, EGD with small esophageal varices, showed portal HTN gastropathy and endo-clip at gastric fundus. 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   - Patient will need GI f/u  - c/w Pantoprazole 40 mg PO QD     New medications/therapies: none  New lines/hardware: none  Labs to be followed outpatient: none   Exam to be followed outpatient: PCP, GI    Discharge plan: discharge to home     #Discharge: do not delete    Patient is a 43 year old male with cirrhosis and alcohol use disorder with hx of withdrawal seizure (7 years ago) and intubation who presented to [facility] for 4 episodes of hematemesis, initially admitted to MICU for management of alcohol withdrawal with CIWA >15 w/ GI consulted for hematemesis, started on octreotide drip and CTX i/s/o small varices found on EGD.      #Alcohol dependence with withdrawal  - counseled on alcohol cessation  - c/w thiamine 100 mg PO QD  - c/w folic acid 1 mg PO QD  - c/w MV PO QD    #Decompensation of cirrhosis of liver  CT AP showing cirrhosis w/ small diffuse esophageal varices. GI consulted for hematemesis, started pt on octreotide drip and CTX ppx. On 12/29, EGD with small esophageal varices, showed portal HTN gastropathy and endo-clip at gastric fundus. 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   - Patient will need GI f/u  - c/w Pantoprazole 40 mg PO QD     New medications/therapies: none  New lines/hardware: none  Labs to be followed outpatient: none   Exam to be followed outpatient: PCP, GI    Discharge plan: discharge to home

## 2023-12-31 NOTE — DISCHARGE NOTE NURSING/CASE MANAGEMENT/SOCIAL WORK - NSDCFUADDAPPT_GEN_ALL_CORE_FT
Please follow up with your PCP, Dr. Akua Liang  on Thursday, January 26  at 1:30 pm  [facility]/[address] [address]   Please follow up with your PCP, Dr. Akua Liang  on Thursday, January 26  at 1:30 pm  [facility]/[address] [address]

## 2023-12-31 NOTE — DISCHARGE NOTE NURSING/CASE MANAGEMENT/SOCIAL WORK - NSDCPEFALRISK_GEN_ALL_CORE
For information on Fall & Injury Prevention, visit: [email or web address] OR  [email or web address] OR  https://www.cdc.gov/steadi/patient.html For information on Fall & Injury Prevention, visit: [email or web address] OR  [email or web address] OR  https://www.cdc.gov/steadi/patient.html

## 2023-12-31 NOTE — DISCHARGE NOTE NURSING/CASE MANAGEMENT/SOCIAL WORK - PATIENT PORTAL LINK FT
You can access the FollowMyHealth Patient Portal offered by [facility] by registering at the following website: [email or web address]. By joining [employer]’s FollowMyHealth portal, you will also be able to view your health information using other applications (apps) compatible with our system. You can access the FollowMyHealth Patient Portal offered by [facility] by registering at the following website: [email or web address]. By joining [employer]’s FollowMyHealth portal, you will also be able to view your health information using other applications (apps) compatible with our system.

## 2023-12-31 NOTE — DISCHARGE NOTE PROVIDER - ATTENDING DISCHARGE PHYSICAL EXAMINATION:
General: NAD, comfortable  HEENT:  EOMI, sclera anicteric, PERRL      Lungs: CTA bilaterally   Cardiovascular: RRR, no murmurs  Gastrointestinal: soft, ND, mild diffuse tenderness, no rebound tenderness, +BS  Musculoskeletal: No LE edema. Moves all extremities.    Skin: Warm, dry, intact  Neurological: A&Ox3

## 2023-12-31 NOTE — DISCHARGE NOTE PROVIDER - NSDCCPCAREPLAN_GEN_ALL_CORE_FT
PRINCIPAL DISCHARGE DIAGNOSIS  Diagnosis: Hematemesis  Assessment and Plan of Treatment: You came into the hospital after vomiting blood. You have cirrhosis of your liver due to excessive alcohol use. It is important that you stop using alcohol and see your primary care provider upon discharge from the hospital.

## 2023-12-31 NOTE — DISCHARGE NOTE PROVIDER - NSDCFUADDAPPT_GEN_ALL_CORE_FT
Please follow up with your PCP, Dr. Akua Liang  on Thursday, January 26  at 1:30 pm  [facility]/[address] [address]   Please follow up with your PCP, Dr. Akua Liang  on Thursday, January 26  at 1:30 pm  [facility]/[address] [address]

## 2024-01-02 LAB
MITOCHONDRIA AB SER-ACNC: SIGNIFICANT CHANGE UP
MITOCHONDRIA AB SER-ACNC: SIGNIFICANT CHANGE UP
SMOOTH MUSCLE AB SER-ACNC: ABNORMAL
SMOOTH MUSCLE AB SER-ACNC: ABNORMAL

## 2024-01-03 LAB
ANA PAT FLD IF-IMP: ABNORMAL
ANA PAT FLD IF-IMP: ABNORMAL
ANA TITR SER: ABNORMAL
ANA TITR SER: ABNORMAL

## 2024-01-05 DIAGNOSIS — K31.89 OTHER DISEASES OF STOMACH AND DUODENUM: ICD-10-CM

## 2024-01-05 DIAGNOSIS — D69.59 OTHER SECONDARY THROMBOCYTOPENIA: ICD-10-CM

## 2024-01-05 DIAGNOSIS — D62 ACUTE POSTHEMORRHAGIC ANEMIA: ICD-10-CM

## 2024-01-05 DIAGNOSIS — F10.220 ALCOHOL DEPENDENCE WITH INTOXICATION, UNCOMPLICATED: ICD-10-CM

## 2024-01-05 DIAGNOSIS — Z71.41 ALCOHOL ABUSE COUNSELING AND SURVEILLANCE OF ALCOHOLIC: ICD-10-CM

## 2024-01-05 DIAGNOSIS — K22.6 GASTRO-ESOPHAGEAL LACERATION-HEMORRHAGE SYNDROME: ICD-10-CM

## 2024-01-05 DIAGNOSIS — F10.230 ALCOHOL DEPENDENCE WITH WITHDRAWAL, UNCOMPLICATED: ICD-10-CM

## 2024-01-05 DIAGNOSIS — E83.42 HYPOMAGNESEMIA: ICD-10-CM

## 2024-01-05 DIAGNOSIS — E87.6 HYPOKALEMIA: ICD-10-CM

## 2024-01-05 DIAGNOSIS — K70.30 ALCOHOLIC CIRRHOSIS OF LIVER WITHOUT ASCITES: ICD-10-CM

## 2024-01-05 DIAGNOSIS — K76.6 PORTAL HYPERTENSION: ICD-10-CM

## 2024-01-05 DIAGNOSIS — K31.7 POLYP OF STOMACH AND DUODENUM: ICD-10-CM

## 2024-01-05 DIAGNOSIS — K92.0 HEMATEMESIS: ICD-10-CM

## 2024-01-05 DIAGNOSIS — I85.10 SECONDARY ESOPHAGEAL VARICES WITHOUT BLEEDING: ICD-10-CM
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
## 2024-01-05 DIAGNOSIS — E83.39 OTHER DISORDERS OF PHOSPHORUS METABOLISM: ICD-10-CM
